# Patient Record
Sex: MALE | Race: WHITE | Employment: FULL TIME | URBAN - METROPOLITAN AREA
[De-identification: names, ages, dates, MRNs, and addresses within clinical notes are randomized per-mention and may not be internally consistent; named-entity substitution may affect disease eponyms.]

---

## 2020-07-13 ENCOUNTER — HOSPITAL ENCOUNTER (OUTPATIENT)
Dept: CT IMAGING | Age: 50
Discharge: HOME | End: 2020-07-15
Payer: COMMERCIAL

## 2020-07-13 PROCEDURE — 71250 CT THORAX DX C-: CPT

## 2021-11-22 ENCOUNTER — HOSPITAL ENCOUNTER (INPATIENT)
Dept: HOSPITAL 100 - ED | Age: 51
LOS: 3 days | Discharge: HOME | DRG: 897 | End: 2021-11-25
Payer: COMMERCIAL

## 2021-11-22 VITALS — HEART RATE: 106 BPM | OXYGEN SATURATION: 96 % | DIASTOLIC BLOOD PRESSURE: 71 MMHG | SYSTOLIC BLOOD PRESSURE: 123 MMHG

## 2021-11-22 VITALS
DIASTOLIC BLOOD PRESSURE: 81 MMHG | RESPIRATION RATE: 16 BRPM | OXYGEN SATURATION: 97 % | TEMPERATURE: 98.2 F | SYSTOLIC BLOOD PRESSURE: 127 MMHG | HEART RATE: 106 BPM

## 2021-11-22 VITALS
TEMPERATURE: 98.06 F | SYSTOLIC BLOOD PRESSURE: 166 MMHG | OXYGEN SATURATION: 97 % | HEART RATE: 110 BPM | RESPIRATION RATE: 18 BRPM | DIASTOLIC BLOOD PRESSURE: 136 MMHG

## 2021-11-22 VITALS
TEMPERATURE: 98.2 F | OXYGEN SATURATION: 96 % | RESPIRATION RATE: 16 BRPM | DIASTOLIC BLOOD PRESSURE: 82 MMHG | SYSTOLIC BLOOD PRESSURE: 123 MMHG | HEART RATE: 97 BPM

## 2021-11-22 VITALS — BODY MASS INDEX: 26.5 KG/M2 | BODY MASS INDEX: 26.1 KG/M2

## 2021-11-22 VITALS
RESPIRATION RATE: 13 BRPM | DIASTOLIC BLOOD PRESSURE: 96 MMHG | HEART RATE: 110 BPM | OXYGEN SATURATION: 95 % | SYSTOLIC BLOOD PRESSURE: 143 MMHG

## 2021-11-22 DIAGNOSIS — F32.A: ICD-10-CM

## 2021-11-22 DIAGNOSIS — J44.9: ICD-10-CM

## 2021-11-22 DIAGNOSIS — G47.30: ICD-10-CM

## 2021-11-22 DIAGNOSIS — Z23: ICD-10-CM
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
## 2021-11-22 DIAGNOSIS — L40.50: ICD-10-CM

## 2021-11-22 DIAGNOSIS — F10.239: Primary | ICD-10-CM

## 2021-11-22 DIAGNOSIS — Z96.641: ICD-10-CM

## 2021-11-22 DIAGNOSIS — G89.29: ICD-10-CM

## 2021-11-22 DIAGNOSIS — Z79.82: ICD-10-CM

## 2021-11-22 DIAGNOSIS — F10.229: ICD-10-CM

## 2021-11-22 DIAGNOSIS — E78.5: ICD-10-CM

## 2021-11-22 DIAGNOSIS — F17.200: ICD-10-CM

## 2021-11-22 DIAGNOSIS — Z79.899: ICD-10-CM

## 2021-11-22 DIAGNOSIS — F41.9: ICD-10-CM

## 2021-11-22 DIAGNOSIS — I10: ICD-10-CM

## 2021-11-22 LAB
ALANINE AMINOTRANSFER ALT/SGPT: 145 U/L (ref 16–61)
ALBUMIN SERPL-MCNC: 3.8 G/DL (ref 3.2–5)
ALCOHOL, BLOOD (MEDICAL)-SERUM: 371 MG/DL
ALKALINE PHOSPHATASE: 128 U/L (ref 45–117)
AMPHET UR-MCNC: NEGATIVE NG/ML
ANION GAP: 15 (ref 5–15)
AST(SGOT): 153 U/L (ref 15–37)
BARBITURATE URINE VISTA: NEGATIVE
BENZODIAZEPINE URINE VISTA: NEGATIVE
BUN SERPL-MCNC: 12 MG/DL (ref 7–18)
BUN/CREAT RATIO: 12.1 RATIO (ref 10–20)
CALCIUM SERPL-MCNC: 9.3 MG/DL (ref 8.5–10.1)
CARBON DIOXIDE: 20 MMOL/L (ref 21–32)
CHLORIDE: 104 MMOL/L (ref 98–107)
COCAINE URINE VISTA: NEGATIVE
DEPRECATED RDW RBC: 48.8 FL (ref 35.1–43.9)
DRUG CONFIRMATION TO FOLLOW?: (no result)
ECSTACY URINE VISTA: NEGATIVE
ERYTHROCYTE [DISTWIDTH] IN BLOOD: 13.7 % (ref 11.6–14.6)
EST GLOM FILT RATE - AFR AMER: 103 ML/MIN (ref 60–?)
ESTIMATED CREATININE CLEARANCE: 92.17 ML/MIN
GLOBULIN: 4.6 G/DL (ref 2.2–4.2)
GLUCOSE: 156 MG/DL (ref 74–106)
HCT VFR BLD AUTO: 43.3 % (ref 40–54)
HEMOGLOBIN: 14.6 G/DL (ref 13–16.5)
HGB BLD-MCNC: 14.6 G/DL (ref 13–16.5)
IMMATURE GRANULOCYTES COUNT: 0.03 X10^3/UL (ref 0–0)
MCV RBC: 96.9 FL (ref 80–94)
MEAN CORP HGB CONC: 33.7 G/DL (ref 32–36)
MEAN PLATELET VOL.: 9.8 FL (ref 6.2–12)
METHADONE URINE VISTA: NEGATIVE
NRBC FLAGGED BY ANALYZER: 0 % (ref 0–5)
PCP UR QL: NEGATIVE
PH UR: 4 [PH]
PLATELET # BLD: 312 K/MM3 (ref 150–450)
PLATELET COUNT: 312 K/MM3 (ref 150–450)
POTASSIUM: 3.7 MMOL/L (ref 3.5–5.1)
RBC # BLD AUTO: 4.47 M/MM3 (ref 4.6–6.2)
RBC DISTRIBUTION WIDTH CV: 13.7 % (ref 11.6–14.6)
RBC DISTRIBUTION WIDTH SD: 48.8 FL (ref 35.1–43.9)
THC URINE VISTA: NEGATIVE
WBC # BLD AUTO: 7.2 K/MM3 (ref 4.4–11)
WHITE BLOOD COUNT: 7.2 K/MM3 (ref 4.4–11)

## 2021-11-22 PROCEDURE — 80053 COMPREHEN METABOLIC PANEL: CPT

## 2021-11-22 PROCEDURE — 85025 COMPLETE CBC W/AUTO DIFF WBC: CPT

## 2021-11-22 PROCEDURE — 80307 DRUG TEST PRSMV CHEM ANLYZR: CPT

## 2021-11-22 PROCEDURE — 97802 MEDICAL NUTRITION INDIV IN: CPT

## 2021-11-22 PROCEDURE — 99406 BEHAV CHNG SMOKING 3-10 MIN: CPT

## 2021-11-22 PROCEDURE — 82077 ASSAY SPEC XCP UR&BREATH IA: CPT

## 2021-11-22 PROCEDURE — 99283 EMERGENCY DEPT VISIT LOW MDM: CPT

## 2021-11-22 PROCEDURE — A4216 STERILE WATER/SALINE, 10 ML: HCPCS

## 2021-11-23 VITALS
RESPIRATION RATE: 18 BRPM | SYSTOLIC BLOOD PRESSURE: 148 MMHG | OXYGEN SATURATION: 93 % | DIASTOLIC BLOOD PRESSURE: 93 MMHG | HEART RATE: 107 BPM | TEMPERATURE: 99 F

## 2021-11-23 VITALS
HEART RATE: 102 BPM | DIASTOLIC BLOOD PRESSURE: 98 MMHG | TEMPERATURE: 97.7 F | RESPIRATION RATE: 18 BRPM | SYSTOLIC BLOOD PRESSURE: 151 MMHG | OXYGEN SATURATION: 97 %

## 2021-11-23 VITALS
TEMPERATURE: 98.24 F | HEART RATE: 105 BPM | OXYGEN SATURATION: 97 % | DIASTOLIC BLOOD PRESSURE: 106 MMHG | SYSTOLIC BLOOD PRESSURE: 151 MMHG | RESPIRATION RATE: 16 BRPM

## 2021-11-23 VITALS
OXYGEN SATURATION: 92 % | TEMPERATURE: 98.3 F | HEART RATE: 90 BPM | DIASTOLIC BLOOD PRESSURE: 78 MMHG | RESPIRATION RATE: 16 BRPM | SYSTOLIC BLOOD PRESSURE: 121 MMHG

## 2021-11-23 VITALS
SYSTOLIC BLOOD PRESSURE: 131 MMHG | DIASTOLIC BLOOD PRESSURE: 97 MMHG | RESPIRATION RATE: 18 BRPM | TEMPERATURE: 98.2 F | OXYGEN SATURATION: 98 % | HEART RATE: 88 BPM

## 2021-11-23 RX ADMIN — Medication 100 MG: at 10:28

## 2021-11-24 VITALS
TEMPERATURE: 97.7 F | OXYGEN SATURATION: 96 % | DIASTOLIC BLOOD PRESSURE: 102 MMHG | RESPIRATION RATE: 18 BRPM | HEART RATE: 72 BPM | SYSTOLIC BLOOD PRESSURE: 142 MMHG

## 2021-11-24 VITALS
HEART RATE: 102 BPM | DIASTOLIC BLOOD PRESSURE: 84 MMHG | TEMPERATURE: 98.96 F | OXYGEN SATURATION: 94 % | RESPIRATION RATE: 16 BRPM | SYSTOLIC BLOOD PRESSURE: 138 MMHG

## 2021-11-24 VITALS
DIASTOLIC BLOOD PRESSURE: 87 MMHG | TEMPERATURE: 98.2 F | OXYGEN SATURATION: 95 % | RESPIRATION RATE: 18 BRPM | HEART RATE: 93 BPM | SYSTOLIC BLOOD PRESSURE: 136 MMHG

## 2021-11-24 VITALS
DIASTOLIC BLOOD PRESSURE: 99 MMHG | OXYGEN SATURATION: 98 % | SYSTOLIC BLOOD PRESSURE: 135 MMHG | HEART RATE: 95 BPM | RESPIRATION RATE: 18 BRPM | TEMPERATURE: 97.88 F

## 2021-11-24 VITALS
OXYGEN SATURATION: 98 % | RESPIRATION RATE: 16 BRPM | SYSTOLIC BLOOD PRESSURE: 138 MMHG | DIASTOLIC BLOOD PRESSURE: 107 MMHG | TEMPERATURE: 98.2 F | HEART RATE: 103 BPM

## 2021-11-24 RX ADMIN — Medication 100 MG: at 09:42

## 2021-11-25 VITALS
RESPIRATION RATE: 18 BRPM | TEMPERATURE: 98.1 F | OXYGEN SATURATION: 96 % | DIASTOLIC BLOOD PRESSURE: 100 MMHG | SYSTOLIC BLOOD PRESSURE: 130 MMHG | HEART RATE: 104 BPM

## 2021-11-25 VITALS
RESPIRATION RATE: 18 BRPM | TEMPERATURE: 98.24 F | SYSTOLIC BLOOD PRESSURE: 125 MMHG | OXYGEN SATURATION: 98 % | DIASTOLIC BLOOD PRESSURE: 90 MMHG | HEART RATE: 90 BPM

## 2021-11-25 RX ADMIN — Medication 100 MG: at 10:26

## 2023-10-12 ENCOUNTER — APPOINTMENT (OUTPATIENT)
Dept: RADIOLOGY | Facility: HOSPITAL | Age: 53
End: 2023-10-12
Payer: COMMERCIAL

## 2023-10-12 ENCOUNTER — HOSPITAL ENCOUNTER (EMERGENCY)
Facility: HOSPITAL | Age: 53
Discharge: HOME | End: 2023-10-12
Attending: EMERGENCY MEDICINE
Payer: COMMERCIAL

## 2023-10-12 VITALS
RESPIRATION RATE: 17 BRPM | HEART RATE: 84 BPM | SYSTOLIC BLOOD PRESSURE: 134 MMHG | TEMPERATURE: 98.1 F | DIASTOLIC BLOOD PRESSURE: 102 MMHG | OXYGEN SATURATION: 96 % | WEIGHT: 195 LBS | BODY MASS INDEX: 27.92 KG/M2 | HEIGHT: 70 IN

## 2023-10-12 DIAGNOSIS — N20.0 KIDNEY STONE: Primary | ICD-10-CM

## 2023-10-12 LAB
ALBUMIN SERPL BCP-MCNC: 4.5 G/DL (ref 3.4–5)
ALP SERPL-CCNC: 90 U/L (ref 33–120)
ALT SERPL W P-5'-P-CCNC: 26 U/L (ref 10–52)
ANION GAP SERPL CALC-SCNC: 12 MMOL/L (ref 10–20)
APPEARANCE UR: CLEAR
AST SERPL W P-5'-P-CCNC: 24 U/L (ref 9–39)
BASOPHILS # BLD AUTO: 0.03 X10*3/UL (ref 0–0.1)
BASOPHILS NFR BLD AUTO: 0.4 %
BILIRUB SERPL-MCNC: 0.8 MG/DL (ref 0–1.2)
BILIRUB UR STRIP.AUTO-MCNC: NEGATIVE MG/DL
BUN SERPL-MCNC: 12 MG/DL (ref 6–23)
CALCIUM SERPL-MCNC: 9.8 MG/DL (ref 8.6–10.3)
CHLORIDE SERPL-SCNC: 103 MMOL/L (ref 98–107)
CO2 SERPL-SCNC: 22 MMOL/L (ref 21–32)
COLOR UR: ABNORMAL
CREAT SERPL-MCNC: 1.27 MG/DL (ref 0.5–1.3)
EOSINOPHIL # BLD AUTO: 0.12 X10*3/UL (ref 0–0.7)
EOSINOPHIL NFR BLD AUTO: 1.5 %
ERYTHROCYTE [DISTWIDTH] IN BLOOD BY AUTOMATED COUNT: 14.2 % (ref 11.5–14.5)
GFR SERPL CREATININE-BSD FRML MDRD: 68 ML/MIN/1.73M*2
GLUCOSE SERPL-MCNC: 133 MG/DL (ref 74–99)
GLUCOSE UR STRIP.AUTO-MCNC: NEGATIVE MG/DL
HCT VFR BLD AUTO: 45.2 % (ref 41–52)
HGB BLD-MCNC: 15.1 G/DL (ref 13.5–17.5)
IMM GRANULOCYTES # BLD AUTO: 0.02 X10*3/UL (ref 0–0.7)
IMM GRANULOCYTES NFR BLD AUTO: 0.2 % (ref 0–0.9)
KETONES UR STRIP.AUTO-MCNC: NEGATIVE MG/DL
LEUKOCYTE ESTERASE UR QL STRIP.AUTO: NEGATIVE
LIPASE SERPL-CCNC: 13 U/L (ref 9–82)
LYMPHOCYTES # BLD AUTO: 1.28 X10*3/UL (ref 1.2–4.8)
LYMPHOCYTES NFR BLD AUTO: 15.9 %
MCH RBC QN AUTO: 30.4 PG (ref 26–34)
MCHC RBC AUTO-ENTMCNC: 33.4 G/DL (ref 32–36)
MCV RBC AUTO: 91 FL (ref 80–100)
MONOCYTES # BLD AUTO: 0.74 X10*3/UL (ref 0.1–1)
MONOCYTES NFR BLD AUTO: 9.2 %
NEUTROPHILS # BLD AUTO: 5.84 X10*3/UL (ref 1.2–7.7)
NEUTROPHILS NFR BLD AUTO: 72.8 %
NITRITE UR QL STRIP.AUTO: NEGATIVE
NRBC BLD-RTO: 0 /100 WBCS (ref 0–0)
PH UR STRIP.AUTO: 6 [PH]
PLATELET # BLD AUTO: 253 X10*3/UL (ref 150–450)
PMV BLD AUTO: 9.5 FL (ref 7.5–11.5)
POTASSIUM SERPL-SCNC: 3.3 MMOL/L (ref 3.5–5.3)
PROT SERPL-MCNC: 7.8 G/DL (ref 6.4–8.2)
PROT UR STRIP.AUTO-MCNC: NEGATIVE MG/DL
RBC # BLD AUTO: 4.97 X10*6/UL (ref 4.5–5.9)
RBC # UR STRIP.AUTO: ABNORMAL /UL
RBC #/AREA URNS AUTO: ABNORMAL /HPF
SODIUM SERPL-SCNC: 134 MMOL/L (ref 136–145)
SP GR UR STRIP.AUTO: 1.01
UROBILINOGEN UR STRIP.AUTO-MCNC: <2 MG/DL
WBC # BLD AUTO: 8 X10*3/UL (ref 4.4–11.3)
WBC #/AREA URNS AUTO: ABNORMAL /HPF

## 2023-10-12 PROCEDURE — 84075 ASSAY ALKALINE PHOSPHATASE: CPT | Performed by: EMERGENCY MEDICINE

## 2023-10-12 PROCEDURE — 74177 CT ABD & PELVIS W/CONTRAST: CPT | Performed by: RADIOLOGY

## 2023-10-12 PROCEDURE — 2550000001 HC RX 255 CONTRASTS: Performed by: EMERGENCY MEDICINE

## 2023-10-12 PROCEDURE — 81001 URINALYSIS AUTO W/SCOPE: CPT | Performed by: EMERGENCY MEDICINE

## 2023-10-12 PROCEDURE — 96374 THER/PROPH/DIAG INJ IV PUSH: CPT

## 2023-10-12 PROCEDURE — 96375 TX/PRO/DX INJ NEW DRUG ADDON: CPT

## 2023-10-12 PROCEDURE — 99284 EMERGENCY DEPT VISIT MOD MDM: CPT | Performed by: EMERGENCY MEDICINE

## 2023-10-12 PROCEDURE — 85025 COMPLETE CBC W/AUTO DIFF WBC: CPT | Performed by: EMERGENCY MEDICINE

## 2023-10-12 PROCEDURE — 2500000004 HC RX 250 GENERAL PHARMACY W/ HCPCS (ALT 636 FOR OP/ED): Performed by: EMERGENCY MEDICINE

## 2023-10-12 PROCEDURE — 74177 CT ABD & PELVIS W/CONTRAST: CPT

## 2023-10-12 PROCEDURE — 83690 ASSAY OF LIPASE: CPT | Performed by: EMERGENCY MEDICINE

## 2023-10-12 PROCEDURE — 36415 COLL VENOUS BLD VENIPUNCTURE: CPT | Performed by: EMERGENCY MEDICINE

## 2023-10-12 RX ORDER — ONDANSETRON 4 MG/1
4 TABLET, ORALLY DISINTEGRATING ORAL EVERY 8 HOURS PRN
Qty: 30 TABLET | Refills: 0 | Status: SHIPPED | OUTPATIENT
Start: 2023-10-12

## 2023-10-12 RX ORDER — ONDANSETRON HYDROCHLORIDE 2 MG/ML
4 INJECTION, SOLUTION INTRAVENOUS ONCE
Status: COMPLETED | OUTPATIENT
Start: 2023-10-12 | End: 2023-10-12

## 2023-10-12 RX ORDER — KETOROLAC TROMETHAMINE 10 MG/1
10 TABLET, FILM COATED ORAL EVERY 6 HOURS PRN
Qty: 20 TABLET | Refills: 0 | Status: ON HOLD | OUTPATIENT
Start: 2023-10-12 | End: 2023-10-19

## 2023-10-12 RX ORDER — ONDANSETRON 4 MG/1
4 TABLET, ORALLY DISINTEGRATING ORAL EVERY 8 HOURS PRN
Qty: 30 TABLET | Refills: 0 | Status: SHIPPED | OUTPATIENT
Start: 2023-10-12 | End: 2023-10-12 | Stop reason: SDUPTHER

## 2023-10-12 RX ORDER — POTASSIUM CHLORIDE 20 MEQ/1
20 TABLET, EXTENDED RELEASE ORAL DAILY
Status: DISCONTINUED | OUTPATIENT
Start: 2023-10-12 | End: 2023-10-12 | Stop reason: HOSPADM

## 2023-10-12 RX ORDER — TAMSULOSIN HYDROCHLORIDE 0.4 MG/1
0.4 CAPSULE ORAL
Qty: 7 CAPSULE | Refills: 0 | Status: SHIPPED | OUTPATIENT
Start: 2023-10-12 | End: 2023-10-19

## 2023-10-12 RX ORDER — KETOROLAC TROMETHAMINE 10 MG/1
10 TABLET, FILM COATED ORAL EVERY 6 HOURS PRN
Qty: 20 TABLET | Refills: 0 | Status: SHIPPED | OUTPATIENT
Start: 2023-10-12 | End: 2023-10-12 | Stop reason: SDUPTHER

## 2023-10-12 RX ORDER — TAMSULOSIN HYDROCHLORIDE 0.4 MG/1
0.4 CAPSULE ORAL
Qty: 7 CAPSULE | Refills: 0 | Status: SHIPPED | OUTPATIENT
Start: 2023-10-12 | End: 2023-10-12 | Stop reason: SDUPTHER

## 2023-10-12 RX ORDER — KETOROLAC TROMETHAMINE 30 MG/ML
15 INJECTION, SOLUTION INTRAMUSCULAR; INTRAVENOUS ONCE
Status: COMPLETED | OUTPATIENT
Start: 2023-10-12 | End: 2023-10-12

## 2023-10-12 RX ADMIN — KETOROLAC TROMETHAMINE 15 MG: 30 INJECTION, SOLUTION INTRAMUSCULAR at 10:31

## 2023-10-12 RX ADMIN — POTASSIUM CHLORIDE 20 MEQ: 1500 TABLET, EXTENDED RELEASE ORAL at 11:14

## 2023-10-12 RX ADMIN — IOHEXOL 90 ML: 350 INJECTION, SOLUTION INTRAVENOUS at 11:26

## 2023-10-12 RX ADMIN — ONDANSETRON 4 MG: 2 INJECTION INTRAMUSCULAR; INTRAVENOUS at 10:31

## 2023-10-12 ASSESSMENT — COLUMBIA-SUICIDE SEVERITY RATING SCALE - C-SSRS
2. HAVE YOU ACTUALLY HAD ANY THOUGHTS OF KILLING YOURSELF?: NO
6. HAVE YOU EVER DONE ANYTHING, STARTED TO DO ANYTHING, OR PREPARED TO DO ANYTHING TO END YOUR LIFE?: NO
1. IN THE PAST MONTH, HAVE YOU WISHED YOU WERE DEAD OR WISHED YOU COULD GO TO SLEEP AND NOT WAKE UP?: NO

## 2023-10-12 ASSESSMENT — PAIN - FUNCTIONAL ASSESSMENT: PAIN_FUNCTIONAL_ASSESSMENT: 0-10

## 2023-10-12 ASSESSMENT — PAIN DESCRIPTION - LOCATION: LOCATION: ABDOMEN

## 2023-10-12 ASSESSMENT — PAIN SCALES - GENERAL
PAINLEVEL_OUTOF10: 7
PAINLEVEL_OUTOF10: 8
PAINLEVEL_OUTOF10: 7
PAINLEVEL_OUTOF10: 8

## 2023-10-12 NOTE — ED PROVIDER NOTES
HPI   Chief Complaint   Patient presents with    Abdominal Pain     Pain with urination for the past couple days. Also lower back since yesterday. Vomiting yesterday. Noted mucous in urine yesterday       Limitations to History: None    HPI: 53-year-old male presents with concern for left back and flank pain.  States he began having increasing urinary symptoms over the past 3 days.  Noticed some sediment within his urine yesterday.  States the pain is increased in his left back.  Sharp in nature.  Admits to vomiting throughout the day and night yesterday.  Denies any fever, chills, changes in bowel habits, fall or trauma.    ------------------------------------------------------------------------------------------------------------------------------------------  Physical Exam:    VS: As documented in the triage note and EMR flowsheet from this visit were reviewed.    Appearance: Alert. cooperative,  in no acute distress.   Skin: Intact,  dry skin, no lesions, rash, petechiae or purpura.   Eyes: PERRLA, EOMs intact,  Conjunctiva pink with no redness or exudates.   HENT: Normocephalic, atraumatic. Nares patent. No intraoral lesions.   Neck: Supple, without meningismus. Trachea at midline. No lymphadenopathy.  Pulmonary: Clear bilaterally with good chest wall excursion. No rales, rhonchi or wheezing. No accessory muscle use or stridor.  Cardiac: Regular rate and rhythm, no rubs, murmurs, or gallops.   Abdomen: Abdomen is soft, nontender, and nondistended.  No palpable organomegaly.  No rebound or guarding.  Left CVA tenderness. Nonsurgical abdomen  Genitourinary: Exam deferred.  Musculoskeletal: Full range of motion.  Pulses full and equal. No cyanosis, clubbing, or edema.  Psychiatric: Appropriate mood and affect.                            No data recorded                Patient History   Past Medical History:   Diagnosis Date    Alcohol abuse     Arthritis     Chronic obstructive pulmonary disease, unspecified  (CMS/[facility])     Chronic bronchitis, obstructive    Cutaneous abscess, unspecified 09/10/2021    Abscess    Hypertension     Personal history of diseases of the blood and blood-forming organs and certain disorders involving the immune mechanism     History of immunodeficiency    Personal history of other diseases of the musculoskeletal system and connective tissue     History of rheumatoid arthritis    Psoriatic arthritis mutilans (CMS/HCC)     Arthritis mutilans     Past Surgical History:   Procedure Laterality Date    OTHER SURGICAL HISTORY  09/10/2021    Colonoscopy    OTHER SURGICAL HISTORY  09/10/2021    Shoulder surgery     No family history on file.  Social History     Tobacco Use    Smoking status: Every Day     Packs/day: .5     Types: Cigarettes    Smokeless tobacco: Never   Vaping Use    Vaping Use: Never used   Substance Use Topics    Alcohol use: Yes     Comment: 1 pint vodka    Drug use: Never       Physical Exam   ED Triage Vitals   Temp Heart Rate Resp BP   10/12/23 1005 10/12/23 1007 10/12/23 1007 10/12/23 1007   36.7 °C (98.1 °F) 97 18 (!) 146/104      SpO2 Temp Source Heart Rate Source Patient Position   10/12/23 1007 10/12/23 1005 -- --   98 % Oral        BP Location FiO2 (%)     -- --             Physical Exam    ED Course & MDM   Diagnoses as of 10/12/23 1205   Kidney stone       Medical Decision Making  Labs Reviewed  URINALYSIS WITH REFLEX MICROSCOPIC - Abnormal     Color, Urine                  Straw                  Appearance, Urine             Clear                  Specific Gravity, Urine       1.008                  pH, Urine                     6.0                    Protein, Urine                NEGATIVE                Glucose, Urine                NEGATIVE                Blood, Urine                    (*)                  Ketones, Urine                NEGATIVE                Bilirubin, Urine              NEGATIVE                Urobilinogen, Urine           <2.0                    Nitrite, Urine                NEGATIVE                Leukocyte Esterase, Urine     NEGATIVE             COMPREHENSIVE METABOLIC PANEL - Abnormal     Glucose                       133 (*)                Sodium                        134 (*)                Potassium                     3.3 (*)                Chloride                      103                    Bicarbonate                   22                     Anion Gap                     12                     Urea Nitrogen                 12                     Creatinine                    1.27                   eGFR                          68                     Calcium                       9.8                    Albumin                       4.5                    Alkaline Phosphatase          90                     Total Protein                 7.8                    AST                           24                     Bilirubin, Total              0.8                    ALT                           26                  URINALYSIS MICROSCOPIC ONLY - Abnormal     WBC, Urine                    1-5                    RBC, Urine                    6-10 (*)            LIPASE - Normal     Lipase                        13                         Narrative: Venipuncture immediately after or during the administration of Metamizole may lead to falsely low results. Testing should be performed immediately prior to Metamizole dosing.  CBC WITH AUTO DIFFERENTIAL  CT abdomen pelvis w IV contrast   Final Result    4 mm left ureterovesical junction calculus causing mild    hydronephrosis.          Signed by: Damian Kern 10/12/2023 11:44 AM    Dictation workstation:   [id number]     Medical Decision Making:  Patient appears well and nontoxic.  Found to have a 4 mm ureterovesicular stone.  No evidence of decreased kidney function.  No evidence of acute urinary infection.  Patient treated with intravenous Toradol and Zofran.  Treated with oral potassium.  Patient feeling  improved on reevaluation.  Will be discharged home with Toradol, Zofran, Flomax.  Given urology follow-up.  Stable at time of discharge.    Differential Diagnoses Considered: Musculoskeletal pain, kidney stone, diverticulitis, UTI    Independent Interpretation of Studies:  I independently interpreted: CT of the abdomen pelvis without contrast shows a left-sided kidney stone.    Escalation of Care:  Appropriate for discharge and follow-up with primary care and possibly urology if needed.    Prescription Drug Consideration: Oral Toradol, Zofran, Flomax.            Procedure  Procedures     Darwin Kramer,   10/12/23 8704

## 2023-10-17 PROBLEM — F10.930 ALCOHOL WITHDRAWAL SYNDROME, UNCOMPLICATED (MULTI): Status: ACTIVE | Noted: 2021-11-28

## 2023-10-17 PROBLEM — L02.91 ABSCESS: Status: ACTIVE | Noted: 2021-08-23

## 2023-10-17 PROBLEM — R73.9 HYPERGLYCEMIA: Status: ACTIVE | Noted: 2021-12-31

## 2023-10-17 PROBLEM — G93.40 ENCEPHALOPATHY: Status: ACTIVE | Noted: 2022-12-22

## 2023-10-17 PROBLEM — M87.159: Status: ACTIVE | Noted: 2021-11-28
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
## 2023-10-17 PROBLEM — F10.10 ALCOHOL ABUSE: Status: ACTIVE | Noted: 2023-02-16

## 2023-10-17 PROBLEM — G93.32 CHRONIC FATIGUE SYNDROME: Status: ACTIVE | Noted: 2023-02-16

## 2023-10-17 PROBLEM — J20.9 ACUTE BRONCHITIS WITH BRONCHOSPASM: Status: ACTIVE | Noted: 2020-03-20

## 2023-10-17 PROBLEM — R47.81 SLURRED SPEECH: Status: ACTIVE | Noted: 2022-07-29

## 2023-10-17 PROBLEM — M16.11 ARTHRITIS OF RIGHT HIP: Status: ACTIVE | Noted: 2021-10-21

## 2023-10-17 PROBLEM — G45.9 TIA (TRANSIENT ISCHEMIC ATTACK): Status: ACTIVE | Noted: 2022-09-17

## 2023-10-17 PROBLEM — D84.9 IMMUNODEFICIENCY DISORDER (MULTI): Status: ACTIVE | Noted: 2020-07-07

## 2023-10-17 PROBLEM — L08.9 INFECTED SEBACEOUS CYST: Status: ACTIVE | Noted: 2022-02-06

## 2023-10-17 PROBLEM — R47.01 APHASIA: Status: ACTIVE | Noted: 2022-08-04

## 2023-10-17 PROBLEM — T07.XXXA LACERATION OF MULTIPLE SITES: Status: ACTIVE | Noted: 2023-05-05

## 2023-10-17 PROBLEM — F32.9 MAJOR DEPRESSIVE DISORDER: Status: ACTIVE | Noted: 2023-02-16

## 2023-10-17 PROBLEM — R47.9 DISTURBANCE IN SPEECH: Status: ACTIVE | Noted: 2022-08-05

## 2023-10-17 PROBLEM — F39 MOOD DISORDER (CMS-HCC): Status: ACTIVE | Noted: 2022-10-08

## 2023-10-17 PROBLEM — F10.921 ALCOHOL INTOXICATION WITH DELIRIUM (CMS-HCC): Status: ACTIVE | Noted: 2022-08-04

## 2023-10-17 PROBLEM — Z79.60 LONG-TERM USE OF IMMUNOSUPPRESSANT MEDICATION: Status: ACTIVE | Noted: 2017-12-19

## 2023-10-17 PROBLEM — L40.52 PSORIATIC ARTHRITIS MUTILANS (MULTI): Status: ACTIVE | Noted: 2020-06-25

## 2023-10-17 PROBLEM — L72.3 INFECTED SEBACEOUS CYST: Status: ACTIVE | Noted: 2022-02-06

## 2023-10-17 PROBLEM — L08.9 INFECTED CYST OF SKIN: Status: ACTIVE | Noted: 2019-05-16

## 2023-10-17 PROBLEM — J44.1 ACUTE EXACERBATION OF CHRONIC OBSTRUCTIVE AIRWAYS DISEASE (MULTI): Status: ACTIVE | Noted: 2020-03-31

## 2023-10-17 PROBLEM — R50.82 POSTOPERATIVE FEVER: Status: ACTIVE | Noted: 2022-11-20

## 2023-10-17 PROBLEM — F41.1 GENERALIZED ANXIETY DISORDER: Status: ACTIVE | Noted: 2023-02-16

## 2023-10-17 PROBLEM — E78.5 HYPERLIPIDEMIA: Status: ACTIVE | Noted: 2022-07-29

## 2023-10-17 PROBLEM — F10.20 ALCOHOL USE DISORDER, SEVERE, DEPENDENCE (MULTI): Status: ACTIVE | Noted: 2022-10-08

## 2023-10-17 PROBLEM — J44.89 CHRONIC OBSTRUCTIVE BRONCHITIS (MULTI): Status: ACTIVE | Noted: 2020-06-25

## 2023-10-17 PROBLEM — D23.4 DERMOID CYST OF SCALP: Status: ACTIVE | Noted: 2019-05-16

## 2023-10-17 PROBLEM — L72.9 INFECTED CYST OF SKIN: Status: ACTIVE | Noted: 2019-05-16

## 2023-10-17 PROBLEM — M06.9 RHEUMATOID ARTHRITIS (MULTI): Status: ACTIVE | Noted: 2021-08-23

## 2023-10-17 PROBLEM — M87.051 AVASCULAR NECROSIS OF RIGHT FEMORAL HEAD (MULTI): Status: ACTIVE | Noted: 2021-10-21

## 2023-10-17 PROBLEM — R68.89 SPELLS OF DECREASED ATTENTIVENESS: Status: ACTIVE | Noted: 2022-09-19

## 2023-10-17 RX ORDER — ARMODAFINIL 200 MG/1
200 TABLET ORAL DAILY
COMMUNITY
Start: 2023-09-11

## 2023-10-17 RX ORDER — LISINOPRIL 20 MG/1
1 TABLET ORAL DAILY
COMMUNITY

## 2023-10-17 RX ORDER — AMLODIPINE BESYLATE 10 MG/1
1 TABLET ORAL DAILY
COMMUNITY
Start: 2020-07-03

## 2023-10-17 RX ORDER — FLUOXETINE HYDROCHLORIDE 20 MG/1
20 CAPSULE ORAL
Status: ON HOLD | COMMUNITY
Start: 2022-08-04 | End: 2023-10-19 | Stop reason: ALTCHOICE

## 2023-10-17 RX ORDER — TALC
3 POWDER (GRAM) TOPICAL DAILY PRN
Status: ON HOLD | COMMUNITY
Start: 2022-12-14 | End: 2023-10-19

## 2023-10-17 RX ORDER — ASPIRIN 81 MG/1
1 TABLET ORAL DAILY
Status: ON HOLD | COMMUNITY
End: 2023-10-19 | Stop reason: ALTCHOICE

## 2023-10-17 RX ORDER — DULOXETIN HYDROCHLORIDE 30 MG/1
30 CAPSULE, DELAYED RELEASE ORAL
Status: ON HOLD | COMMUNITY
Start: 2022-12-20 | End: 2023-10-19 | Stop reason: ALTCHOICE

## 2023-10-17 RX ORDER — ACYCLOVIR 400 MG/1
1 TABLET ORAL DAILY
COMMUNITY
Start: 2021-03-08

## 2023-10-17 RX ORDER — TOPIRAMATE 50 MG/1
50 TABLET, FILM COATED ORAL
Status: ON HOLD | COMMUNITY
End: 2023-10-19 | Stop reason: ALTCHOICE

## 2023-10-17 RX ORDER — CHLORDIAZEPOXIDE HYDROCHLORIDE 25 MG/1
25-50 CAPSULE, GELATIN COATED ORAL
Status: ON HOLD | COMMUNITY
Start: 2022-12-15 | End: 2023-10-19 | Stop reason: ALTCHOICE

## 2023-10-17 RX ORDER — CLONAZEPAM 1 MG/1
1 TABLET ORAL 3 TIMES DAILY
Status: ON HOLD | COMMUNITY
End: 2023-10-19

## 2023-10-17 RX ORDER — ATORVASTATIN CALCIUM 80 MG/1
1 TABLET, FILM COATED ORAL DAILY
COMMUNITY
Start: 2022-07-31

## 2023-10-17 RX ORDER — PREDNISONE 10 MG/1
10 TABLET ORAL
Status: ON HOLD | COMMUNITY
Start: 2020-12-28 | End: 2023-10-19 | Stop reason: ALTCHOICE

## 2023-10-17 RX ORDER — TRIAMCINOLONE ACETONIDE 1 MG/G
CREAM TOPICAL 2 TIMES DAILY
COMMUNITY

## 2023-10-17 RX ORDER — CLONIDINE HYDROCHLORIDE 0.1 MG/1
1 TABLET ORAL 2 TIMES DAILY
COMMUNITY
Start: 2022-12-14

## 2023-10-17 RX ORDER — ALBUTEROL SULFATE 90 UG/1
2 AEROSOL, METERED RESPIRATORY (INHALATION) EVERY 4 HOURS PRN
COMMUNITY
Start: 2020-08-27

## 2023-10-17 RX ORDER — BUSPIRONE HYDROCHLORIDE 15 MG/1
15 TABLET ORAL
COMMUNITY

## 2023-10-17 RX ORDER — DOXYCYCLINE 100 MG/1
1 TABLET ORAL EVERY 12 HOURS
Status: ON HOLD | COMMUNITY
Start: 2021-08-27 | End: 2023-10-19 | Stop reason: ALTCHOICE

## 2023-10-17 RX ORDER — LANOLIN ALCOHOL/MO/W.PET/CERES
100 CREAM (GRAM) TOPICAL
Status: ON HOLD | COMMUNITY
Start: 2022-08-01 | End: 2023-10-19 | Stop reason: ALTCHOICE

## 2023-10-17 RX ORDER — ESCITALOPRAM OXALATE 10 MG/1
1 TABLET ORAL DAILY
COMMUNITY
Start: 2023-09-08

## 2023-10-17 RX ORDER — BUPROPION HYDROCHLORIDE 150 MG/1
1 TABLET, EXTENDED RELEASE ORAL DAILY
Status: ON HOLD | COMMUNITY
Start: 2020-08-27 | End: 2023-10-19 | Stop reason: ALTCHOICE

## 2023-10-17 RX ORDER — ACAMPROSATE CALCIUM 333 MG/1
2 TABLET, DELAYED RELEASE ORAL 3 TIMES DAILY
Status: ON HOLD | COMMUNITY
End: 2023-10-19 | Stop reason: ALTCHOICE

## 2023-10-17 RX ORDER — GABAPENTIN 600 MG/1
600 TABLET ORAL 3 TIMES DAILY
COMMUNITY

## 2023-10-17 RX ORDER — DIAZEPAM 5 MG/1
5 TABLET ORAL
Status: ON HOLD | COMMUNITY
End: 2023-10-19 | Stop reason: ALTCHOICE

## 2023-10-17 RX ORDER — FLUTICASONE PROPIONATE AND SALMETEROL XINAFOATE 115; 21 UG/1; UG/1
AEROSOL, METERED RESPIRATORY (INHALATION)
Status: ON HOLD | COMMUNITY
Start: 2021-08-24 | End: 2023-10-19 | Stop reason: ALTCHOICE

## 2023-10-17 RX ORDER — DOXYCYCLINE 100 MG/1
100 CAPSULE ORAL
Status: ON HOLD | COMMUNITY
Start: 2020-05-16 | End: 2023-10-19 | Stop reason: ALTCHOICE

## 2023-10-17 RX ORDER — HYDROXYZINE PAMOATE 25 MG/1
25 CAPSULE ORAL 3 TIMES DAILY PRN
COMMUNITY

## 2023-10-17 RX ORDER — SECUKINUMAB 150 MG/ML
150 INJECTION SUBCUTANEOUS
COMMUNITY

## 2023-10-17 RX ORDER — PRIMIDONE 50 MG/1
0.5 TABLET ORAL NIGHTLY
COMMUNITY

## 2023-10-17 RX ORDER — TRAZODONE HYDROCHLORIDE 50 MG/1
50 TABLET ORAL
COMMUNITY
Start: 2022-12-16

## 2023-10-17 RX ORDER — FOLIC ACID 1 MG/1
1 TABLET ORAL DAILY
Status: ON HOLD | COMMUNITY
Start: 2022-08-01 | End: 2023-10-19 | Stop reason: ALTCHOICE

## 2023-10-18 ENCOUNTER — PREP FOR PROCEDURE (OUTPATIENT)
Dept: UROLOGY | Facility: CLINIC | Age: 53
End: 2023-10-18

## 2023-10-18 ENCOUNTER — OFFICE VISIT (OUTPATIENT)
Dept: UROLOGY | Facility: CLINIC | Age: 53
End: 2023-10-18
Payer: COMMERCIAL

## 2023-10-18 VITALS — RESPIRATION RATE: 18 BRPM | WEIGHT: 195 LBS | BODY MASS INDEX: 27.98 KG/M2

## 2023-10-18 DIAGNOSIS — R35.1 NOCTURIA: ICD-10-CM

## 2023-10-18 DIAGNOSIS — R10.9 FLANK PAIN: ICD-10-CM

## 2023-10-18 DIAGNOSIS — N20.1 URETERAL CALCULUS: ICD-10-CM

## 2023-10-18 DIAGNOSIS — N20.0 KIDNEY STONE: ICD-10-CM

## 2023-10-18 PROCEDURE — 99204 OFFICE O/P NEW MOD 45 MIN: CPT | Performed by: UROLOGY

## 2023-10-18 ASSESSMENT — ENCOUNTER SYMPTOMS
EYES NEGATIVE: 1
CHILLS: 0
DIFFICULTY URINATING: 0
SHORTNESS OF BREATH: 0
FEVER: 0
ENDOCRINE NEGATIVE: 1
PSYCHIATRIC NEGATIVE: 1
FLANK PAIN: 1
NAUSEA: 0
COUGH: 0
ALLERGIC/IMMUNOLOGIC NEGATIVE: 1

## 2023-10-18 NOTE — PROGRESS NOTES
Subjective   Patient ID: Jaime Liz Jr. is a 53 y.o. male who presents for Nephrolithiasis.  Patient is here for an ER follow for a kidney stone. CT showed 4mm left UVJ stone with mild hydro.. Patient has not passed the stone. He was started on Flomax, Toradol and zofran.. has F/C, No N/V.  Chronic BPH with LUTs, sx are chronic and stable.. Nocturia x 1, depending on fluid intake. ED is mild, complains of retrograde ejaculation. Retrograde ejaculation has been a chronic issue        Review of Systems   Constitutional:  Negative for chills and fever.   HENT: Negative.     Eyes: Negative.    Respiratory:  Negative for cough and shortness of breath.    Cardiovascular:  Negative for chest pain and leg swelling.   Gastrointestinal:  Negative for nausea.   Endocrine: Negative.    Genitourinary:  Positive for flank pain. Negative for difficulty urinating.        Negative except for documented in HPI   Allergic/Immunologic: Negative.    Neurological:         Alert & oriented X 3   Hematological:         Denies blood thinners   Psychiatric/Behavioral: Negative.         Objective   Physical Exam  Vitals and nursing note reviewed.   Constitutional:       General: He is not in acute distress.     Appearance: Normal appearance.   Pulmonary:      Effort: Pulmonary effort is normal.   Abdominal:      Tenderness: There is no abdominal tenderness.   Genitourinary:     Comments: Kidneys non palpable bilaterally  Bladder non palpable or tender  Scrotum no mass, No hydrocele  Epididymis- No spermatocele. Non Tender.  Testicles: No mass  Urethra: No discharge  Penis within normal limits... No lesions. circumcised  Prostate - symmetric, no nodules. Benign  Seminal Vesicals: No mass.  Sphincter tone: normal  Neurological:      Mental Status: He is alert.         Assessment/Plan   Problem List Items Addressed This Visit             ICD-10-CM       Gastrointestinal and Abdominal    Flank pain R10.9       Genitourinary and  Reproductive    Kidney stone N20.0    Nocturia R35.1        All available PSA values reviewed, Options discussed. Questions answered.  PSA ordered   Diet changes for prostate health discussed and educational information given. Pros/Cons of prostate health supplements discussed.   Treatment options for LUTS reviewed  Will evaluate for Stricture at time of Cysto  Discussed timed voiding. Discussed fluid and caffeine intake  Treatment options for ED reviewed.  Discussed retrograde ejaculation  Lifestyle change to help prevent UTIs discussed. Encouraged fluid intake.  CT reviewed  Will proceed with Left ureteroscopy with Holmium and Stent

## 2023-10-19 ENCOUNTER — HOSPITAL ENCOUNTER (OUTPATIENT)
Facility: HOSPITAL | Age: 53
Setting detail: OUTPATIENT SURGERY
Discharge: HOME | End: 2023-10-19
Attending: UROLOGY | Admitting: UROLOGY
Payer: COMMERCIAL

## 2023-10-19 ENCOUNTER — APPOINTMENT (OUTPATIENT)
Dept: RADIOLOGY | Facility: HOSPITAL | Age: 53
End: 2023-10-19
Payer: COMMERCIAL

## 2023-10-19 ENCOUNTER — ANESTHESIA EVENT (OUTPATIENT)
Dept: OPERATING ROOM | Facility: HOSPITAL | Age: 53
End: 2023-10-19
Payer: COMMERCIAL

## 2023-10-19 ENCOUNTER — ANESTHESIA (OUTPATIENT)
Dept: OPERATING ROOM | Facility: HOSPITAL | Age: 53
End: 2023-10-19
Payer: COMMERCIAL

## 2023-10-19 VITALS
WEIGHT: 185.85 LBS | HEIGHT: 70 IN | SYSTOLIC BLOOD PRESSURE: 137 MMHG | DIASTOLIC BLOOD PRESSURE: 95 MMHG | TEMPERATURE: 98.6 F | OXYGEN SATURATION: 95 % | BODY MASS INDEX: 26.61 KG/M2 | HEART RATE: 91 BPM | RESPIRATION RATE: 15 BRPM

## 2023-10-19 DIAGNOSIS — N20.1 URETERAL CALCULUS: ICD-10-CM

## 2023-10-19 DIAGNOSIS — N20.1 URETERAL CALCULUS: Primary | ICD-10-CM

## 2023-10-19 DIAGNOSIS — R10.9 FLANK PAIN: ICD-10-CM

## 2023-10-19 DIAGNOSIS — N20.0 KIDNEY STONE: ICD-10-CM

## 2023-10-19 PROCEDURE — 3700000002 HC GENERAL ANESTHESIA TIME - EACH INCREMENTAL 1 MINUTE: Performed by: UROLOGY

## 2023-10-19 PROCEDURE — 2500000004 HC RX 250 GENERAL PHARMACY W/ HCPCS (ALT 636 FOR OP/ED): Performed by: ANESTHESIOLOGY

## 2023-10-19 PROCEDURE — 7100000010 HC PHASE TWO TIME - EACH INCREMENTAL 1 MINUTE: Performed by: UROLOGY

## 2023-10-19 PROCEDURE — 52332 CYSTOSCOPY AND TREATMENT: CPT | Performed by: UROLOGY

## 2023-10-19 PROCEDURE — 2720000007 HC OR 272 NO HCPCS: Performed by: UROLOGY

## 2023-10-19 PROCEDURE — 7100000009 HC PHASE TWO TIME - INITIAL BASE CHARGE: Performed by: UROLOGY

## 2023-10-19 PROCEDURE — 74420 UROGRAPHY RTRGR +-KUB: CPT

## 2023-10-19 PROCEDURE — C1769 GUIDE WIRE: HCPCS | Performed by: UROLOGY

## 2023-10-19 PROCEDURE — 3600000008 HC OR TIME - EACH INCREMENTAL 1 MINUTE - PROCEDURE LEVEL THREE: Performed by: UROLOGY

## 2023-10-19 PROCEDURE — 7100000001 HC RECOVERY ROOM TIME - INITIAL BASE CHARGE: Performed by: UROLOGY

## 2023-10-19 PROCEDURE — 7100000002 HC RECOVERY ROOM TIME - EACH INCREMENTAL 1 MINUTE: Performed by: UROLOGY

## 2023-10-19 PROCEDURE — 3700000001 HC GENERAL ANESTHESIA TIME - INITIAL BASE CHARGE: Performed by: UROLOGY

## 2023-10-19 PROCEDURE — 2780000003 HC OR 278 NO HCPCS: Performed by: UROLOGY

## 2023-10-19 PROCEDURE — 74420 UROGRAPHY RTRGR +-KUB: CPT | Performed by: UROLOGY

## 2023-10-19 PROCEDURE — 2550000001 HC RX 255 CONTRASTS: Performed by: UROLOGY

## 2023-10-19 PROCEDURE — 52351 CYSTOURETERO & OR PYELOSCOPE: CPT | Performed by: UROLOGY

## 2023-10-19 PROCEDURE — 2500000001 HC RX 250 WO HCPCS SELF ADMINISTERED DRUGS (ALT 637 FOR MEDICARE OP): Performed by: ANESTHESIOLOGY

## 2023-10-19 PROCEDURE — 3600000003 HC OR TIME - INITIAL BASE CHARGE - PROCEDURE LEVEL THREE: Performed by: UROLOGY

## 2023-10-19 DEVICE — URETERAL STENT
Type: IMPLANTABLE DEVICE | Site: URETER | Status: FUNCTIONAL
Brand: POLARIS™ ULTRA

## 2023-10-19 RX ORDER — PHENAZOPYRIDINE HYDROCHLORIDE 200 MG/1
200 TABLET, FILM COATED ORAL 3 TIMES DAILY PRN
Qty: 30 TABLET | Refills: 0 | Status: SHIPPED | OUTPATIENT
Start: 2023-10-19

## 2023-10-19 RX ORDER — PROPOFOL 10 MG/ML
INJECTION, EMULSION INTRAVENOUS AS NEEDED
Status: DISCONTINUED | OUTPATIENT
Start: 2023-10-19 | End: 2023-10-19

## 2023-10-19 RX ORDER — HYDROCODONE BITARTRATE AND ACETAMINOPHEN 5; 325 MG/1; MG/1
2 TABLET ORAL EVERY 6 HOURS PRN
Status: DISCONTINUED | OUTPATIENT
Start: 2023-10-19 | End: 2023-10-19 | Stop reason: HOSPADM

## 2023-10-19 RX ORDER — CIPROFLOXACIN 500 MG/1
250 TABLET ORAL 2 TIMES DAILY
Qty: 3 TABLET | Refills: 0 | Status: SHIPPED | OUTPATIENT
Start: 2023-10-19 | End: 2023-10-22

## 2023-10-19 RX ORDER — LIDOCAINE HYDROCHLORIDE 10 MG/ML
0.1 INJECTION, SOLUTION EPIDURAL; INFILTRATION; INTRACAUDAL; PERINEURAL ONCE
Status: DISCONTINUED | OUTPATIENT
Start: 2023-10-19 | End: 2023-10-19 | Stop reason: HOSPADM

## 2023-10-19 RX ORDER — MIDAZOLAM HYDROCHLORIDE 1 MG/ML
2 INJECTION INTRAMUSCULAR; INTRAVENOUS ONCE AS NEEDED
Status: COMPLETED | OUTPATIENT
Start: 2023-10-19 | End: 2023-10-19

## 2023-10-19 RX ORDER — SODIUM CHLORIDE, SODIUM LACTATE, POTASSIUM CHLORIDE, CALCIUM CHLORIDE 600; 310; 30; 20 MG/100ML; MG/100ML; MG/100ML; MG/100ML
100 INJECTION, SOLUTION INTRAVENOUS CONTINUOUS
Status: DISCONTINUED | OUTPATIENT
Start: 2023-10-19 | End: 2023-10-19 | Stop reason: HOSPADM

## 2023-10-19 RX ORDER — KETOROLAC TROMETHAMINE 10 MG/1
10 TABLET, FILM COATED ORAL EVERY 6 HOURS PRN
Qty: 20 TABLET | Refills: 0 | OUTPATIENT
Start: 2023-10-19 | End: 2024-01-26

## 2023-10-19 RX ORDER — FENTANYL CITRATE 50 UG/ML
INJECTION, SOLUTION INTRAMUSCULAR; INTRAVENOUS AS NEEDED
Status: DISCONTINUED | OUTPATIENT
Start: 2023-10-19 | End: 2023-10-19

## 2023-10-19 RX ORDER — TOPIRAMATE 50 MG/1
50 TABLET, FILM COATED ORAL DAILY
COMMUNITY

## 2023-10-19 RX ORDER — ONDANSETRON HYDROCHLORIDE 2 MG/ML
4 INJECTION, SOLUTION INTRAVENOUS ONCE
Status: COMPLETED | OUTPATIENT
Start: 2023-10-19 | End: 2023-10-19

## 2023-10-19 RX ORDER — HYDROMORPHONE HYDROCHLORIDE 2 MG/ML
0.5 INJECTION, SOLUTION INTRAMUSCULAR; INTRAVENOUS; SUBCUTANEOUS EVERY 5 MIN PRN
Status: DISCONTINUED | OUTPATIENT
Start: 2023-10-19 | End: 2023-10-19 | Stop reason: HOSPADM

## 2023-10-19 RX ORDER — ONDANSETRON HYDROCHLORIDE 2 MG/ML
4 INJECTION, SOLUTION INTRAVENOUS ONCE
Status: DISCONTINUED | OUTPATIENT
Start: 2023-10-19 | End: 2023-10-19 | Stop reason: HOSPADM

## 2023-10-19 RX ADMIN — MIDAZOLAM HYDROCHLORIDE 2 MG: 1 INJECTION, SOLUTION INTRAMUSCULAR; INTRAVENOUS at 16:10

## 2023-10-19 RX ADMIN — HYDROMORPHONE HYDROCHLORIDE 0.5 MG: 2 INJECTION, SOLUTION INTRAMUSCULAR; INTRAVENOUS; SUBCUTANEOUS at 17:38

## 2023-10-19 RX ADMIN — SODIUM CHLORIDE, POTASSIUM CHLORIDE, SODIUM LACTATE AND CALCIUM CHLORIDE 100 ML/HR: 600; 310; 30; 20 INJECTION, SOLUTION INTRAVENOUS at 15:29

## 2023-10-19 RX ADMIN — ONDANSETRON 4 MG: 2 INJECTION INTRAMUSCULAR; INTRAVENOUS at 15:30

## 2023-10-19 RX ADMIN — HYDROCODONE BITARTRATE AND ACETAMINOPHEN 2 TABLET: 5; 325 TABLET ORAL at 18:14

## 2023-10-19 RX ADMIN — SODIUM CHLORIDE, POTASSIUM CHLORIDE, SODIUM LACTATE AND CALCIUM CHLORIDE 100 ML/HR: 600; 310; 30; 20 INJECTION, SOLUTION INTRAVENOUS at 17:45

## 2023-10-19 RX ADMIN — HYDROMORPHONE HYDROCHLORIDE 0.5 MG: 2 INJECTION, SOLUTION INTRAMUSCULAR; INTRAVENOUS; SUBCUTANEOUS at 17:52

## 2023-10-19 RX ADMIN — FENTANYL CITRATE 100 MCG: 50 INJECTION, SOLUTION INTRAMUSCULAR; INTRAVENOUS at 17:02

## 2023-10-19 RX ADMIN — PROPOFOL 200 MG: 10 INJECTION, EMULSION INTRAVENOUS at 17:02

## 2023-10-19 SDOH — HEALTH STABILITY: MENTAL HEALTH: CURRENT SMOKER: 0

## 2023-10-19 ASSESSMENT — COLUMBIA-SUICIDE SEVERITY RATING SCALE - C-SSRS
1. IN THE PAST MONTH, HAVE YOU WISHED YOU WERE DEAD OR WISHED YOU COULD GO TO SLEEP AND NOT WAKE UP?: NO
2. HAVE YOU ACTUALLY HAD ANY THOUGHTS OF KILLING YOURSELF?: NO
6. HAVE YOU EVER DONE ANYTHING, STARTED TO DO ANYTHING, OR PREPARED TO DO ANYTHING TO END YOUR LIFE?: NO

## 2023-10-19 ASSESSMENT — PAIN SCALES - GENERAL
PAIN_LEVEL: 2
PAINLEVEL_OUTOF10: 6
PAINLEVEL_OUTOF10: 7
PAINLEVEL_OUTOF10: 10 - WORST POSSIBLE PAIN
PAINLEVEL_OUTOF10: 10 - WORST POSSIBLE PAIN
PAINLEVEL_OUTOF10: 7

## 2023-10-19 ASSESSMENT — PAIN - FUNCTIONAL ASSESSMENT
PAIN_FUNCTIONAL_ASSESSMENT: 0-10
PAIN_FUNCTIONAL_ASSESSMENT: 0-10

## 2023-10-19 NOTE — ANESTHESIA PROCEDURE NOTES
Airway  Date/Time: 10/19/2023 5:00 PM  Urgency: elective      Staffing  Performed: attending   Authorized by: Rebel Mancilla MD    Performed by: Rebel Mancilla MD  Patient location during procedure: OR    Indications and Patient Condition  Indications for airway management: anesthesia  Spontaneous ventilation: present  Sedation level: deep  Preoxygenated: yes  Patient position: sniffing  Mask difficulty assessment: 1 - vent by mask    Final Airway Details  Final airway type: supraglottic airway      Successful airway: Supreme  Size 5     Number of attempts at approach: 1

## 2023-10-19 NOTE — OP NOTE
Cystoscopy with Left RPG left ureteroscopy and left Insertion Stent Ureter (L), Cystoscopy with Retrograde Pyelogram (L) Operative Note     Date: 10/19/2023  OR Location: [facility] OR    Name: Jaime Liz Jr., : 1970, Age: 53 y.o., MRN: [id number], Sex: male    Diagnosis  Pre-op Diagnosis     * Ureteral calculus [N20.1] Post-op Diagnosis     * Ureteral calculus [N20.1]     Procedures  Cystoscopy with Left RPG left ureteroscopy and left Insertion Stent Ureter  03602 - FL CYSTO W/INSERT URETERAL STENT    Cystoscopy with Retrograde Pyelogram  37504 - CHG UROGRAPHY RETROGRADE WITH/WO KUB      Surgeons      * Alexis Corcoran - Primary    Resident/Fellow/Other Assistant:  Surgeon(s) and Role:    Procedure Summary  Anesthesia: General  ASA: II  Anesthesia Staff: Anesthesiologist: Rebel Mancilla MD  Estimated Blood Loss: 0mL  Intra-op Medications:   Medication Name Total Dose   iohexol (OMNIPaque) 300 mg iodine/mL solution 10 mL              Anesthesia Record               Intraprocedure I/O Totals       None           Specimen: No specimens collected     Staff:   Circulator: Alice Brambila RN  Scrub Person: Mason Calle RN         Drains and/or Catheters: * None in log *    Tourniquet Times:         Implants:  Implants       Type Name Action Serial No.      Shunt STENT, POLARIS ULTRA  5FR 24CM, DISPOSABLE - [id number] Implanted                   Indications: Jaime Liz Jr. is an 53 y.o. male who is having surgery for Ureteral calculus [N20.1].     The patient was seen in the preoperative area. The risks, benefits, complications, treatment options, non-operative alternatives, expected recovery and outcomes were discussed with the patient. The possibilities of reaction to medication, pulmonary aspiration, injury to surrounding structures, bleeding, recurrent infection, the need for additional procedures, failure to diagnose a condition, and creating a complication requiring transfusion or operation were discussed  with the patient. The patient concurred with the proposed plan, giving informed consent.  The site of surgery was properly noted/marked if necessary per policy. The patient has been actively warmed in preoperative area.     Preoperative diagnosis: Left ureteral stone  Postoperative diagnosis: Same  Procedure: cystoscopy with Left RPG and ureteroscopy and Left stent insertion  Physician: ROLANDO  Anesthesia: Gen.  Estimated blood loss: None  Indications and consent: The patient presents for treatment of a left ureteral stone. After the risks, benefits, alternatives, and indications of the procedure were explained to the patient they consented.  Procedure: The patient was brought to the operating room and placed on the table in the supine position. After adequate anesthesia was obtained, the patient was prepped and draped in the standard surgical fashion. First, the cystoscope was inserted into the bladder. Formal cystoscopy was performed.  A Left retrograde pyelogram was performed which suggested a filling defect in the ureter We then performed ureteroscopy however the ureteroscope was unable to reach the stone. The stone was irrigated out. A 5 x 24 stent was placed over the existing guidewire. This was done under fluoroscopic vision. Proper positioning was confirmed.The patient tolerated the procedure well and there no complications.     Attending Attestation: I was present and scrubbed for the entire procedure.    Alexis Corcoran  Phone Number: [phone]

## 2023-10-19 NOTE — ANESTHESIA POSTPROCEDURE EVALUATION
Patient: Jaime Liz Jr.    Procedure Summary       Date: 10/19/23 Room / Location: [facility] OR 01 / [facility] OR    Anesthesia Start: 1659 Anesthesia Stop: 1722    Procedures:       Cystoscopy with Left RPG left ureteroscopy and left Insertion Stent Ureter (Left)      Cystoscopy with Retrograde Pyelogram (Left) Diagnosis:       Ureteral calculus      (Ureteral calculus [N20.1])    Surgeons: Alexis Corcoran MD Responsible Provider: Rebel Mancilla MD    Anesthesia Type: general ASA Status: 2            Anesthesia Type: general    Vitals Value Taken Time   BP  10/19/23 1731   Temp  10/19/23 1731   Pulse  10/19/23 1731   Resp  10/19/23 1731   SpO2  10/19/23 1731       Anesthesia Post Evaluation    Patient location during evaluation: PACU  Patient participation: complete - patient participated  Level of consciousness: awake  Pain score: 2  Pain management: adequate  Airway patency: patent  Two or more strategies used to mitigate risk of obstructive sleep apnea  Cardiovascular status: acceptable  Respiratory status: acceptable  Hydration status: acceptable        No notable events documented.

## 2023-10-19 NOTE — BRIEF OP NOTE
Date: 10/19/2023  OR Location: [facility] OR    Name: Jaime Liz Jr., : 1970, Age: 53 y.o., MRN: [id number], Sex: male    Diagnosis  Pre-op Diagnosis     * Ureteral calculus [N20.1] Post-op Diagnosis     * Ureteral calculus [N20.1]     Procedures  Cystoscopy with Left RPG left ureteroscopy and left Insertion Stent Ureter  17644 - AL CYSTO W/INSERT URETERAL STENT    Cystoscopy with Retrograde Pyelogram  22022 - CHG UROGRAPHY RETROGRADE WITH/WO KUB      Surgeons      * Alexis Corcoran - Primary    Resident/Fellow/Other Assistant:  Surgeon(s) and Role:    Procedure Summary  Anesthesia: General  ASA: II  Anesthesia Staff: Anesthesiologist: Rebel Mancilla MD  Estimated Blood Loss: 0mL  Intra-op Medications: * No intraprocedure medications in log *           Anesthesia Record               Intraprocedure I/O Totals       None           Specimen: No specimens collected     Staff:   Circulator: Alice Brambila RN  Scrub Person: Mason Calle RN    Preoperative diagnosis: Left ureteral stone  Postoperative diagnosis: Same  Procedure: cystoscopy with Left RPG and ureteroscopy and Left stent insertion  Physician: ROLANDO  Anesthesia: Gen.  Estimated blood loss: None  Indications and consent: The patient presents for treatment of a left ureteral stone. After the risks, benefits, alternatives, and indications of the procedure were explained to the patient they consented.  Procedure: The patient was brought to the operating room and placed on the table in the supine position. After adequate anesthesia was obtained, the patient was prepped and draped in the standard surgical fashion. First, the cystoscope was inserted into the bladder. Formal cystoscopy was performed.  A Left retrograde pyelogram was performed which suggested a filling defect in the ureter We then performed ureteroscopy however the ureteroscope the stone was irrigated out. A 5 [place] open-ended catheter which allowed us to place the guidewire past the stone into  the renal pelvis. A 5 x 24 stent was placed over the existing guidewire. This was done under fluoroscopic vision. Proper positioning was confirmed.The patient tolerated the procedure well and there no complications.     Follow up stent removal, no KUB    Attending Attestation: I was present and scrubbed for the entire procedure.    Alexis Corcoran  Phone Number: [phone]

## 2023-10-19 NOTE — ANESTHESIA PREPROCEDURE EVALUATION
Patient: Jaime Liz Jr.    Procedure Information       Date/Time: 10/19/23 1630    Procedures:       Cystoscopy with Left RPG left ureteroscopy with holmium and left Insertion Stent Ureter (Left)      Cystoscopy with Retrograde Pyelogram (Left)    Location: [facility] OR 01 / [facility] OR    Surgeons: Alexis Corcoran MD            Relevant Problems   Cardiovascular   (+) Carotid artery narrowings   (+) Hyperlipidemia   (+) Hypertension      /Renal   (+) Kidney stone      Neuro/Psych   (+) Carotid artery narrowings   (+) Generalized anxiety disorder   (+) Major depressive disorder      Pulmonary   (+) Acute exacerbation of chronic obstructive airways disease (CMS/HCC)      Musculoskeletal   (+) Rheumatoid arthritis (CMS/HCC)      Infectious Disease   (+) Infected sebaceous cyst      Other   (+) Arthritis of right hip   (+) Psoriasis with arthropathy (CMS/HCC)   (+) Psoriatic arthritis mutilans (CMS/HCC)   (+) Rheumatoid arthritis (CMS/HCC)       Clinical information reviewed:   Tobacco  Allergies  Meds   Med Hx  Surg Hx   Fam Hx          NPO Detail:  No data recorded     Physical Exam    Airway  Mallampati: II  TM distance: >3 FB  Neck ROM: full     Cardiovascular   Rhythm: regular  Rate: normal     Dental    Pulmonary   Breath sounds clear to auscultation     Abdominal            Anesthesia Plan    ASA 2     general     The patient is not a current smoker.  Patient was not previously instructed to abstain from smoking on day of procedure.  Patient did not smoke on day of procedure.    intravenous induction   Postoperative administration of opioids is intended.  Anesthetic plan and risks discussed with patient.

## 2023-10-24 NOTE — PROGRESS NOTES
Patient ID: Jaime Liz Jr. is a 53 y.o. male.    Procedures   The patient was prepped using a Betadine solution. Lidocaine jelly was instilled into the urethra. The flexible cystoscope was sterilely inserted into the urethra and formal cystoscopy performed in a systematic fashion. . For detailed findings of the procedure, please see Dr. Corcoran remarks below  CIPRO 250MG POBID TIMES 3 DAYS GIVEN     STENT REMOVED. NORMAL CYSTO    F/U 3 MONTHS WITH KUB AND PSA

## 2023-10-25 ENCOUNTER — OFFICE VISIT (OUTPATIENT)
Dept: UROLOGY | Facility: CLINIC | Age: 53
End: 2023-10-25
Payer: COMMERCIAL

## 2023-10-25 VITALS — HEIGHT: 70 IN | BODY MASS INDEX: 26.77 KG/M2 | WEIGHT: 187 LBS

## 2023-10-25 DIAGNOSIS — N20.0 KIDNEY STONE: Primary | ICD-10-CM

## 2023-10-25 DIAGNOSIS — N20.1 LEFT URETERAL STONE: ICD-10-CM

## 2023-10-25 DIAGNOSIS — R35.1 NOCTURIA: ICD-10-CM

## 2023-10-25 PROCEDURE — 4004F PT TOBACCO SCREEN RCVD TLK: CPT | Performed by: UROLOGY

## 2023-10-25 PROCEDURE — 52310 CYSTOSCOPY AND TREATMENT: CPT | Performed by: UROLOGY

## 2023-10-25 RX ORDER — CIPROFLOXACIN 250 MG/1
250 TABLET, FILM COATED ORAL 2 TIMES DAILY
Qty: 6 TABLET | Refills: 0 | Status: SHIPPED | OUTPATIENT
Start: 2023-10-25 | End: 2023-10-28

## 2023-11-01 ENCOUNTER — APPOINTMENT (OUTPATIENT)
Dept: UROLOGY | Facility: CLINIC | Age: 53
End: 2023-11-01
Payer: COMMERCIAL

## 2023-11-06 ENCOUNTER — HOSPITAL ENCOUNTER (OUTPATIENT)
Dept: RADIOLOGY | Facility: HOSPITAL | Age: 53
Discharge: HOME | End: 2023-11-06
Payer: COMMERCIAL

## 2023-11-06 DIAGNOSIS — N20.1 LEFT URETERAL STONE: ICD-10-CM

## 2023-11-06 PROCEDURE — 74018 RADEX ABDOMEN 1 VIEW: CPT | Performed by: RADIOLOGY

## 2023-11-06 PROCEDURE — 74018 RADEX ABDOMEN 1 VIEW: CPT

## 2023-12-13 ENCOUNTER — LAB (OUTPATIENT)
Dept: LAB | Facility: LAB | Age: 53
End: 2023-12-13
Payer: COMMERCIAL

## 2023-12-13 DIAGNOSIS — R35.1 NOCTURIA: ICD-10-CM

## 2023-12-13 LAB — PSA SERPL-MCNC: 0.93 NG/ML

## 2023-12-13 PROCEDURE — 84153 ASSAY OF PSA TOTAL: CPT

## 2023-12-13 PROCEDURE — 36415 COLL VENOUS BLD VENIPUNCTURE: CPT

## 2023-12-27 ENCOUNTER — APPOINTMENT (OUTPATIENT)
Dept: UROLOGY | Facility: CLINIC | Age: 53
End: 2023-12-27
Payer: COMMERCIAL

## 2024-01-24 ENCOUNTER — APPOINTMENT (OUTPATIENT)
Dept: UROLOGY | Facility: CLINIC | Age: 54
End: 2024-01-24
Payer: COMMERCIAL

## 2024-01-26 ENCOUNTER — HOSPITAL ENCOUNTER (EMERGENCY)
Facility: HOSPITAL | Age: 54
Discharge: HOME | End: 2024-01-26
Attending: EMERGENCY MEDICINE
Payer: COMMERCIAL

## 2024-01-26 ENCOUNTER — APPOINTMENT (OUTPATIENT)
Dept: RADIOLOGY | Facility: HOSPITAL | Age: 54
End: 2024-01-26
Payer: COMMERCIAL

## 2024-01-26 VITALS
BODY MASS INDEX: 27.2 KG/M2 | RESPIRATION RATE: 18 BRPM | HEIGHT: 70 IN | SYSTOLIC BLOOD PRESSURE: 127 MMHG | WEIGHT: 190 LBS | TEMPERATURE: 97.7 F | DIASTOLIC BLOOD PRESSURE: 86 MMHG | HEART RATE: 86 BPM | OXYGEN SATURATION: 96 %

## 2024-01-26 DIAGNOSIS — R10.32 LEFT INGUINAL PAIN: Primary | ICD-10-CM

## 2024-01-26 LAB
ALBUMIN SERPL BCP-MCNC: 4.6 G/DL (ref 3.4–5)
ALP SERPL-CCNC: 80 U/L (ref 33–120)
ALT SERPL W P-5'-P-CCNC: 59 U/L (ref 10–52)
ANION GAP SERPL CALC-SCNC: 15 MMOL/L (ref 10–20)
APPEARANCE UR: CLEAR
AST SERPL W P-5'-P-CCNC: 52 U/L (ref 9–39)
BASOPHILS # BLD AUTO: 0.05 X10*3/UL (ref 0–0.1)
BASOPHILS NFR BLD AUTO: 0.7 %
BILIRUB SERPL-MCNC: 0.9 MG/DL (ref 0–1.2)
BILIRUB UR STRIP.AUTO-MCNC: NEGATIVE MG/DL
BUN SERPL-MCNC: 13 MG/DL (ref 6–23)
CALCIUM SERPL-MCNC: 8.6 MG/DL (ref 8.6–10.3)
CHLORIDE SERPL-SCNC: 104 MMOL/L (ref 98–107)
CK SERPL-CCNC: 310 U/L (ref 0–325)
CO2 SERPL-SCNC: 24 MMOL/L (ref 21–32)
COLOR UR: YELLOW
CREAT SERPL-MCNC: 0.92 MG/DL (ref 0.5–1.3)
EGFRCR SERPLBLD CKD-EPI 2021: >90 ML/MIN/1.73M*2
EOSINOPHIL # BLD AUTO: 0.14 X10*3/UL (ref 0–0.7)
EOSINOPHIL NFR BLD AUTO: 2 %
ERYTHROCYTE [DISTWIDTH] IN BLOOD BY AUTOMATED COUNT: 14.4 % (ref 11.5–14.5)
GLUCOSE SERPL-MCNC: 96 MG/DL (ref 74–99)
GLUCOSE UR STRIP.AUTO-MCNC: NEGATIVE MG/DL
HCT VFR BLD AUTO: 44.7 % (ref 41–52)
HGB BLD-MCNC: 14.8 G/DL (ref 13.5–17.5)
HOLD SPECIMEN: NORMAL
IMM GRANULOCYTES # BLD AUTO: 0.02 X10*3/UL (ref 0–0.7)
IMM GRANULOCYTES NFR BLD AUTO: 0.3 % (ref 0–0.9)
KETONES UR STRIP.AUTO-MCNC: NEGATIVE MG/DL
LEUKOCYTE ESTERASE UR QL STRIP.AUTO: NEGATIVE
LYMPHOCYTES # BLD AUTO: 2.11 X10*3/UL (ref 1.2–4.8)
LYMPHOCYTES NFR BLD AUTO: 30.8 %
MCH RBC QN AUTO: 30.8 PG (ref 26–34)
MCHC RBC AUTO-ENTMCNC: 33.1 G/DL (ref 32–36)
MCV RBC AUTO: 93 FL (ref 80–100)
MONOCYTES # BLD AUTO: 0.39 X10*3/UL (ref 0.1–1)
MONOCYTES NFR BLD AUTO: 5.7 %
MUCOUS THREADS #/AREA URNS AUTO: NORMAL /LPF
NEUTROPHILS # BLD AUTO: 4.14 X10*3/UL (ref 1.2–7.7)
NEUTROPHILS NFR BLD AUTO: 60.5 %
NITRITE UR QL STRIP.AUTO: NEGATIVE
NRBC BLD-RTO: 0 /100 WBCS (ref 0–0)
PH UR STRIP.AUTO: 6 [PH]
PLATELET # BLD AUTO: 271 X10*3/UL (ref 150–450)
POTASSIUM SERPL-SCNC: 4.1 MMOL/L (ref 3.5–5.3)
PROT SERPL-MCNC: 7.1 G/DL (ref 6.4–8.2)
PROT UR STRIP.AUTO-MCNC: ABNORMAL MG/DL
RBC # BLD AUTO: 4.8 X10*6/UL (ref 4.5–5.9)
RBC # UR STRIP.AUTO: NEGATIVE /UL
RBC #/AREA URNS AUTO: NORMAL /HPF
SODIUM SERPL-SCNC: 139 MMOL/L (ref 136–145)
SP GR UR STRIP.AUTO: 1.02
UROBILINOGEN UR STRIP.AUTO-MCNC: <2 MG/DL
WBC # BLD AUTO: 6.9 X10*3/UL (ref 4.4–11.3)
WBC #/AREA URNS AUTO: NORMAL /HPF

## 2024-01-26 PROCEDURE — 74177 CT ABD & PELVIS W/CONTRAST: CPT | Performed by: RADIOLOGY

## 2024-01-26 PROCEDURE — 82550 ASSAY OF CK (CPK): CPT | Performed by: EMERGENCY MEDICINE

## 2024-01-26 PROCEDURE — 80053 COMPREHEN METABOLIC PANEL: CPT | Performed by: EMERGENCY MEDICINE

## 2024-01-26 PROCEDURE — 76870 US EXAM SCROTUM: CPT | Performed by: RADIOLOGY

## 2024-01-26 PROCEDURE — 99285 EMERGENCY DEPT VISIT HI MDM: CPT | Mod: 25

## 2024-01-26 PROCEDURE — 96374 THER/PROPH/DIAG INJ IV PUSH: CPT

## 2024-01-26 PROCEDURE — 74177 CT ABD & PELVIS W/CONTRAST: CPT

## 2024-01-26 PROCEDURE — 2500000004 HC RX 250 GENERAL PHARMACY W/ HCPCS (ALT 636 FOR OP/ED): Performed by: EMERGENCY MEDICINE

## 2024-01-26 PROCEDURE — 2550000001 HC RX 255 CONTRASTS: Performed by: EMERGENCY MEDICINE

## 2024-01-26 PROCEDURE — 36415 COLL VENOUS BLD VENIPUNCTURE: CPT | Performed by: EMERGENCY MEDICINE

## 2024-01-26 PROCEDURE — 85025 COMPLETE CBC W/AUTO DIFF WBC: CPT | Performed by: EMERGENCY MEDICINE

## 2024-01-26 PROCEDURE — 96361 HYDRATE IV INFUSION ADD-ON: CPT

## 2024-01-26 PROCEDURE — 81001 URINALYSIS AUTO W/SCOPE: CPT | Performed by: EMERGENCY MEDICINE

## 2024-01-26 PROCEDURE — 93975 VASCULAR STUDY: CPT

## 2024-01-26 PROCEDURE — 99285 EMERGENCY DEPT VISIT HI MDM: CPT | Performed by: EMERGENCY MEDICINE

## 2024-01-26 PROCEDURE — 96375 TX/PRO/DX INJ NEW DRUG ADDON: CPT

## 2024-01-26 RX ORDER — MORPHINE SULFATE 4 MG/ML
4 INJECTION, SOLUTION INTRAMUSCULAR; INTRAVENOUS ONCE
Status: COMPLETED | OUTPATIENT
Start: 2024-01-26 | End: 2024-01-26

## 2024-01-26 RX ORDER — KETOROLAC TROMETHAMINE 30 MG/ML
15 INJECTION, SOLUTION INTRAMUSCULAR; INTRAVENOUS ONCE
Status: COMPLETED | OUTPATIENT
Start: 2024-01-26 | End: 2024-01-26

## 2024-01-26 RX ORDER — CYCLOBENZAPRINE HCL 5 MG
5 TABLET ORAL 3 TIMES DAILY PRN
Qty: 12 TABLET | Refills: 0 | Status: SHIPPED | OUTPATIENT
Start: 2024-01-26 | End: 2024-01-30

## 2024-01-26 RX ORDER — ONDANSETRON HYDROCHLORIDE 2 MG/ML
4 INJECTION, SOLUTION INTRAVENOUS ONCE
Status: COMPLETED | OUTPATIENT
Start: 2024-01-26 | End: 2024-01-26

## 2024-01-26 RX ORDER — CYCLOBENZAPRINE HCL 5 MG
5 TABLET ORAL 3 TIMES DAILY PRN
Qty: 12 TABLET | Refills: 0 | Status: SHIPPED | OUTPATIENT
Start: 2024-01-26 | End: 2024-01-26 | Stop reason: SDUPTHER

## 2024-01-26 RX ORDER — KETOROLAC TROMETHAMINE 10 MG/1
10 TABLET, FILM COATED ORAL EVERY 6 HOURS PRN
Qty: 20 TABLET | Refills: 0 | Status: SHIPPED | OUTPATIENT
Start: 2024-01-26 | End: 2024-01-31

## 2024-01-26 RX ORDER — KETOROLAC TROMETHAMINE 10 MG/1
10 TABLET, FILM COATED ORAL EVERY 6 HOURS PRN
Qty: 20 TABLET | Refills: 0 | Status: SHIPPED | OUTPATIENT
Start: 2024-01-26 | End: 2024-01-26 | Stop reason: SDUPTHER

## 2024-01-26 RX ADMIN — SODIUM CHLORIDE 1000 ML: 9 INJECTION, SOLUTION INTRAVENOUS at 08:25

## 2024-01-26 RX ADMIN — MORPHINE SULFATE 4 MG: 4 INJECTION, SOLUTION INTRAMUSCULAR; INTRAVENOUS at 08:25

## 2024-01-26 RX ADMIN — ONDANSETRON 4 MG: 2 INJECTION INTRAMUSCULAR; INTRAVENOUS at 08:25

## 2024-01-26 RX ADMIN — KETOROLAC TROMETHAMINE 15 MG: 30 INJECTION, SOLUTION INTRAMUSCULAR; INTRAVENOUS at 10:14

## 2024-01-26 RX ADMIN — IOHEXOL 70 ML: 350 INJECTION, SOLUTION INTRAVENOUS at 09:17

## 2024-01-26 ASSESSMENT — PAIN DESCRIPTION - PAIN TYPE: TYPE: ACUTE PAIN

## 2024-01-26 ASSESSMENT — PAIN SCALES - GENERAL
PAINLEVEL_OUTOF10: 10 - WORST POSSIBLE PAIN
PAINLEVEL_OUTOF10: 10 - WORST POSSIBLE PAIN

## 2024-01-26 ASSESSMENT — PAIN - FUNCTIONAL ASSESSMENT
PAIN_FUNCTIONAL_ASSESSMENT: 0-10
PAIN_FUNCTIONAL_ASSESSMENT: 0-10

## 2024-01-26 ASSESSMENT — PAIN DESCRIPTION - ORIENTATION: ORIENTATION: RIGHT

## 2024-01-26 ASSESSMENT — PAIN DESCRIPTION - LOCATION: LOCATION: GROIN

## 2024-01-26 ASSESSMENT — PAIN DESCRIPTION - PROGRESSION: CLINICAL_PROGRESSION: NOT CHANGED

## 2024-01-26 NOTE — ED PROVIDER NOTES
HPI   No chief complaint on file.      Limitations to History: None    HPI: 53-year-old male presents with multiple complaints.  States that he has pain all over.  Main complaint is left groin pain as well as scrotal pain.  States is been present over the past 2 days.  Wife states that through the light last night he was complaining of this pain.  Sharp in nature.  States also he slipped in the snow and has pain in his right hip.  He denies any nausea, vomiting, abdominal pain, urinary symptoms.  Patient is an alcoholic and daily drinker.  Denies any head injury or loss of consciousness.  No anticoagulation.    Additional History Obtained from: Wife at the bedside.    ------------------------------------------------------------------------------------------------------------------------------------------  Physical Exam:    VS: As documented in the triage note and EMR flowsheet from this visit were reviewed.    Appearance: Alert. cooperative,  in no acute distress.   Skin: Intact,  dry skin, no lesions, rash, petechiae or purpura.   Eyes: PERRLA, EOMs intact,  Conjunctiva pink with no redness or exudates.   HENT: Normocephalic, atraumatic. Nares patent. No intraoral lesions.   Neck: Supple, without meningismus. Trachea at midline. No lymphadenopathy.  Pulmonary: Clear bilaterally with good chest wall excursion. No rales, rhonchi or wheezing. No accessory muscle use or stridor.  Cardiac: Regular rate and rhythm, no rubs, murmurs, or gallops.   Abdomen: Abdomen is soft, nontender, and nondistended.  No palpable organomegaly.  No rebound or guarding.  No CVA tenderness. Nonsurgical abdomen  Genitourinary: Exam deferred.  Musculoskeletal: Full range of motion.  Pulses full and equal. No cyanosis, clubbing, or edema.  Neurological:  Cranial nerves are grossly intact, grossly normal sensation, no weakness, no focal findings identified.  Psychiatric: Appropriate mood and affect.                            No data recorded                 Patient History   Past Medical History:   Diagnosis Date    Alcohol abuse     Arthritis     Chronic obstructive pulmonary disease, unspecified (CMS/HCC)     Chronic bronchitis, obstructive    Cutaneous abscess, unspecified 09/10/2021    Abscess    Hypertension     Personal history of diseases of the blood and blood-forming organs and certain disorders involving the immune mechanism     History of immunodeficiency    Personal history of other diseases of the musculoskeletal system and connective tissue     History of rheumatoid arthritis    Psoriatic arthritis mutilans (CMS/HCC)     Arthritis mutilans     Past Surgical History:   Procedure Laterality Date    JOINT REPLACEMENT Right 2019    OTHER SURGICAL HISTORY  09/10/2021    Colonoscopy    OTHER SURGICAL HISTORY  09/10/2021    Shoulder surgery    SHOULDER SURGERY Right     SKIN CANCER EXCISION      TESTICLE SURGERY       Family History   Problem Relation Name Age of Onset    Lung cancer Mother      Brain cancer Mother      Lung cancer Father      Throat cancer Father       Social History     Tobacco Use    Smoking status: Every Day     Packs/day: .5     Types: Cigarettes    Smokeless tobacco: Never   Vaping Use    Vaping Use: Never used   Substance Use Topics    Alcohol use: Yes     Comment: 1 pint vodka    Drug use: Never       Physical Exam   ED Triage Vitals   Temp Pulse Resp BP   -- -- -- --      SpO2 Temp src Heart Rate Source Patient Position   -- -- -- --      BP Location FiO2 (%)     -- --       Physical Exam    ED Course & MDM   Diagnoses as of 01/26/24 1043   Left inguinal pain       Medical Decision Making  CT abdomen pelvis w IV contrast   Final Result    1. Abnormal skin thickening with suggestion of ulceration over the    left lower anterior abdominal wall. Direct examination recommended.    2. Fatty liver.    3. Single nonobstructing right renal stone.                Signed by: Tristan Tineo 1/26/2024 10:10 AM    Dictation workstation:    [id number]     US scrotum w doppler   Final Result    Right varicocele.          MACRO:    None          Signed by: Mattie Dorsey 1/26/2024 8:42 AM    Dictation workstation:   [id number]     Labs Reviewed  COMPREHENSIVE METABOLIC PANEL - Abnormal     Glucose                       96                     Sodium                        139                    Potassium                     4.1                    Chloride                      104                    Bicarbonate                   24                     Anion Gap                     15                     Urea Nitrogen                 13                     Creatinine                    0.92                   eGFR                          >90                    Calcium                       8.6                    Albumin                       4.6                    Alkaline Phosphatase          80                     Total Protein                 7.1                    AST                           52 (*)                 Bilirubin, Total              0.9                    ALT                           59 (*)              URINALYSIS WITH REFLEX CULTURE AND MICROSCOPIC - Abnormal     Color, Urine                  Yellow                 Appearance, Urine             Clear                  Specific Gravity, Urine       1.019                  pH, Urine                     6.0                    Protein, Urine                30 (1+) (*)               Glucose, Urine                NEGATIVE                Blood, Urine                  NEGATIVE                Ketones, Urine                NEGATIVE                Bilirubin, Urine              NEGATIVE                Urobilinogen, Urine           <2.0                   Nitrite, Urine                NEGATIVE                Leukocyte Esterase, Urine     NEGATIVE             CREATINE KINASE - Normal     Creatine Kinase               310                 CBC WITH AUTO DIFFERENTIAL     WBC                           6.9                     nRBC                          0.0                    RBC                           4.80                   Hemoglobin                    14.8                   Hematocrit                    44.7                   MCV                           93                     MCH                           30.8                   MCHC                          33.1                   RDW                           14.4                   Platelets                     271                    Neutrophils %                 60.5                   Immature Granulocytes %, Automated   0.3                    Lymphocytes %                 30.8                   Monocytes %                   5.7                    Eosinophils %                 2.0                    Basophils %                   0.7                    Neutrophils Absolute          4.14                   Immature Granulocytes Absolute, Au*   0.02                   Lymphocytes Absolute          2.11                   Monocytes Absolute            0.39                   Eosinophils Absolute          0.14                   Basophils Absolute            0.05                URINALYSIS WITH REFLEX CULTURE AND MICROSCOPIC         Narrative: The following orders were created for panel order Urinalysis with Reflex Culture and Microscopic.                  Procedure                               Abnormality         Status                                     ---------                               -----------         ------                                     Urinalysis with Reflex C...[[id number]]  Abnormal            Final result                               Extra Urine Gray Tube[[id number]]                            In process                                                   Please view results for these tests on the individual orders.  EXTRA URINE GRAY TUBE  URINALYSIS MICROSCOPIC WITH REFLEX CULTURE    Medical Decision Making:    Patient appears well nontoxic.   Patient treated with intravenous morphine, Zofran, 1 L normal saline.  Lab work shows a slight transaminitis likely secondary to chronic alcohol abuse.  Scrotal ultrasound without acute pathology.  CT of the abdomen pelvis otherwise negative.  Urine shows no evidence of infection.  Patient continued to have pain and was treated with intravenous Toradol.  Patient be given oral Toradol and cyclobenzaprine at home with concern for muscle strain.  Advised on follow-up with primary care.  Stable at time of discharge.    Differential Diagnoses Considered: Muscle strain, kidney stone, diverticulitis, scrotal cellulitis    Independent Interpretation of Studies:  I independently interpreted: CT of the abdomen pelvis with IV contrast shows no intra-abdominal free air.    Escalation of Care:  Appropriate for discharge and follow-up with primary care.    Prescription Drug Consideration: Oral ketorolac and cyclobenzaprine.          Procedure  Procedures     Darwin Kramer DO  01/26/24 1045

## 2024-02-08 ENCOUNTER — HOSPITAL ENCOUNTER (OUTPATIENT)
Dept: RADIOLOGY | Facility: CLINIC | Age: 54
Discharge: HOME | End: 2024-02-08
Payer: COMMERCIAL

## 2024-02-08 ENCOUNTER — OFFICE VISIT (OUTPATIENT)
Dept: UROLOGY | Facility: CLINIC | Age: 54
End: 2024-02-08
Payer: COMMERCIAL

## 2024-02-08 VITALS — BODY MASS INDEX: 26.83 KG/M2 | RESPIRATION RATE: 16 BRPM | WEIGHT: 187 LBS

## 2024-02-08 DIAGNOSIS — R36.1 HEMATOSPERMIA: ICD-10-CM

## 2024-02-08 DIAGNOSIS — N20.0 KIDNEY STONE: ICD-10-CM

## 2024-02-08 DIAGNOSIS — R35.1 NOCTURIA: ICD-10-CM

## 2024-02-08 DIAGNOSIS — N52.9 ERECTILE DYSFUNCTION, UNSPECIFIED ERECTILE DYSFUNCTION TYPE: ICD-10-CM

## 2024-02-08 DIAGNOSIS — N50.819 PAIN IN TESTICLE, UNSPECIFIED LATERALITY: ICD-10-CM

## 2024-02-08 PROCEDURE — 74018 RADEX ABDOMEN 1 VIEW: CPT

## 2024-02-08 PROCEDURE — 74018 RADEX ABDOMEN 1 VIEW: CPT | Performed by: RADIOLOGY

## 2024-02-08 PROCEDURE — 99214 OFFICE O/P EST MOD 30 MIN: CPT | Performed by: UROLOGY

## 2024-02-08 RX ORDER — TADALAFIL 20 MG/1
20 TABLET ORAL DAILY PRN
Qty: 30 TABLET | Refills: 3 | Status: SHIPPED | OUTPATIENT
Start: 2024-02-08 | End: 2024-03-09

## 2024-02-08 RX ORDER — TADALAFIL 20 MG/1
20 TABLET ORAL DAILY PRN
Qty: 30 TABLET | Refills: 3 | Status: CANCELLED | OUTPATIENT
Start: 2024-02-08 | End: 2024-03-09

## 2024-02-08 ASSESSMENT — ENCOUNTER SYMPTOMS
CHILLS: 0
PSYCHIATRIC NEGATIVE: 1
FEVER: 0
ALLERGIC/IMMUNOLOGIC NEGATIVE: 1
NAUSEA: 0
SHORTNESS OF BREATH: 0
DIFFICULTY URINATING: 0
COUGH: 0
ENDOCRINE NEGATIVE: 1
EYES NEGATIVE: 1

## 2024-02-08 NOTE — PROGRESS NOTES
Subjective   Patient ID: Jaime Liz Jr. is a 53 y.o. male.    HPI  Patient is here for ER follow up. He was seen in ER on 1/26 for testicular pain. Scrotal US was done which showed right varicocele. Patient had Left Varicocele repair as a teenager by Dr Roe. CT was done as well which showed single 3MM nonobstructive right renal stone. Hx of kidney stones. S/P Left ureteroscopy on 10/23. PSA 12/23 was 0.93..He also has hematospermia. ED is mild.       Review of Systems   Constitutional:  Negative for chills and fever.   HENT: Negative.     Eyes: Negative.    Respiratory:  Negative for cough and shortness of breath.    Cardiovascular:  Negative for chest pain and leg swelling.   Gastrointestinal:  Negative for nausea.   Endocrine: Negative.    Genitourinary:  Negative for difficulty urinating.        Negative except for documented in HPI   Allergic/Immunologic: Negative.    Neurological:         Alert & oriented X 3   Hematological:         Denies blood thinners   Psychiatric/Behavioral: Negative.         Objective   Physical Exam  Vitals and nursing note reviewed.   Constitutional:       General: He is not in acute distress.     Appearance: Normal appearance.   Pulmonary:      Effort: Pulmonary effort is normal.   Abdominal:      Tenderness: There is no abdominal tenderness.   Genitourinary:     Comments: Kidneys non palpable bilaterally  Bladder non palpable or tender  Scrotum no mass, No hydrocele  Epididymis- No spermatocele. Non Tender.  Testicles: No mass  Urethra: No discharge  Penis within normal limits... No lesions. Circumcised  Prostate - deferred  Neurological:      Mental Status: He is alert.         Assessment/Plan   Diagnoses and all orders for this visit:  Kidney stone  Nocturia  Pain in testicle, unspecified laterality  Hematospermia      All available PSA values reviewed, Options discussed. Questions answered.   Diet changes for prostate health discussed and educational information  given. Pros/Cons of prostate health supplements discussed.   Treatment options for LUTS reviewed  Discussed timed voiding. Discussed fluid and caffeine intake  Treatment options for ED reviewed.  Cialis Rx given  Lifestyle change to help prevent UTIs discussed. Encouraged fluid intake.  U/S reviewed-Sx improved  CT reviewed  KUB ordered  Stone prevention discussed. Diet reviewed. Discussed fluid intake      F/U with KUB virtual

## 2024-02-09 ASSESSMENT — ENCOUNTER SYMPTOMS
DIFFICULTY URINATING: 0
ENDOCRINE NEGATIVE: 1
PSYCHIATRIC NEGATIVE: 1
SHORTNESS OF BREATH: 0
COUGH: 0
NAUSEA: 0
CHILLS: 0
EYES NEGATIVE: 1
ALLERGIC/IMMUNOLOGIC NEGATIVE: 1
FEVER: 0

## 2024-02-09 NOTE — PROGRESS NOTES
Subjective   Patient ID: Jaime Liz Jr. is a 53 y.o. male.    HPI  Patient is here for KUB results. He was seen Thursday for ER follup up. CT showed 3mm nonobstrutive right renal stone. Hx of kidney stones. S/P Left ureteroscopy on 10/23. PSA 12/23 was 0.93..He also has hematospermia. ED is mild.        Review of Systems   Constitutional:  Negative for chills and fever.   HENT: Negative.     Eyes: Negative.    Respiratory:  Negative for cough and shortness of breath.    Cardiovascular:  Negative for chest pain and leg swelling.   Gastrointestinal:  Negative for nausea.   Endocrine: Negative.    Genitourinary:  Negative for difficulty urinating.        Negative except for documented in HPI   Allergic/Immunologic: Negative.    Neurological:         Alert & oriented X 3   Hematological:         Denies blood thinners   Psychiatric/Behavioral: Negative.         Objective   Physical Exam  No PE done given the virtual nature of visit.   Assessment/Plan   Diagnoses and all orders for this visit:  Kidney stone  Nocturia  Ureteral calculus      All available PSA values reviewed, Options discussed. Questions answered.   Diet changes for prostate health discussed and educational information given. Pros/Cons of prostate health supplements discussed.   Treatment options for LUTS reviewed  Discussed timed voiding. Discussed fluid and caffeine intake  Treatment options for ED reviewed.  Lifestyle change to help prevent UTIs discussed. Encouraged fluid intake.  KUB reviewed    F/U with PSA and KUB 1 year

## 2024-02-12 ENCOUNTER — TELEMEDICINE (OUTPATIENT)
Dept: UROLOGY | Facility: CLINIC | Age: 54
End: 2024-02-12
Payer: COMMERCIAL

## 2024-02-12 DIAGNOSIS — R35.1 NOCTURIA: ICD-10-CM

## 2024-02-12 DIAGNOSIS — N20.1 URETERAL CALCULUS: ICD-10-CM

## 2024-02-12 DIAGNOSIS — N20.0 KIDNEY STONE: Primary | ICD-10-CM

## 2024-02-12 PROCEDURE — 99213 OFFICE O/P EST LOW 20 MIN: CPT | Performed by: UROLOGY

## 2024-06-03 ENCOUNTER — CLINICAL SUPPORT (OUTPATIENT)
Dept: SLEEP MEDICINE | Facility: CLINIC | Age: 54
End: 2024-06-03
Payer: COMMERCIAL

## 2024-06-03 VITALS
TEMPERATURE: 98.7 F | WEIGHT: 186.95 LBS | HEIGHT: 70 IN | DIASTOLIC BLOOD PRESSURE: 94 MMHG | BODY MASS INDEX: 26.76 KG/M2 | SYSTOLIC BLOOD PRESSURE: 134 MMHG

## 2024-06-03 DIAGNOSIS — G47.33 OSA (OBSTRUCTIVE SLEEP APNEA): ICD-10-CM

## 2024-06-03 ASSESSMENT — SLEEP AND FATIGUE QUESTIONNAIRES
HOW LIKELY ARE YOU TO NOD OFF OR FALL ASLEEP WHILE SITTING AND READING: HIGH CHANCE OF DOZING
HOW LIKELY ARE YOU TO NOD OFF OR FALL ASLEEP IN A CAR, WHILE STOPPED FOR A FEW MINUTES IN TRAFFIC: WOULD NEVER DOZE
HOW LIKELY ARE YOU TO NOD OFF OR FALL ASLEEP WHILE LYING DOWN TO REST IN THE AFTERNOON WHEN CIRCUMSTANCES PERMIT: HIGH CHANCE OF DOZING
HOW LIKELY ARE YOU TO NOD OFF OR FALL ASLEEP WHILE WATCHING TV: MODERATE CHANCE OF DOZING
ESS-CHAD TOTAL SCORE: 12
HOW LIKELY ARE YOU TO NOD OFF OR FALL ASLEEP WHEN YOU ARE A PASSENGER IN A CAR FOR AN HOUR WITHOUT A BREAK: SLIGHT CHANCE OF DOZING
HOW LIKELY ARE YOU TO NOD OFF OR FALL ASLEEP WHILE SITTING AND TALKING TO SOMEONE: SLIGHT CHANCE OF DOZING
SITING INACTIVE IN A PUBLIC PLACE LIKE A CLASS ROOM OR A MOVIE THEATER: SLIGHT CHANCE OF DOZING
HOW LIKELY ARE YOU TO NOD OFF OR FALL ASLEEP WHILE SITTING QUIETLY AFTER LUNCH WITHOUT ALCOHOL: SLIGHT CHANCE OF DOZING

## 2024-06-04 NOTE — PROGRESS NOTES
"[facility] NOTE:     Patient: Jaime Liz Jr.   MRN//AGE: [id number]  1970  53 y.o.   Technologist: MARY KAY Senior   Room: 4   Service Date: 6/3/2024        Sleep Testing Location: [place] [address]     [place]: 12    TECHNOLOGIST SLEEP STUDY PROCEDURE NOTE:   This sleep study is being conducted according to the policies and procedures outlined by the [employer] accreditation standards.  The sleep study procedure and processes involved during this appointment was explained to the patient and all questions were answered. The patient verbalized understanding.      The patient is a 53 y.o. year old male scheduled for a CPAP titration with montage of:  PAP MASTER .     The study that was ultimately completed was a CPAP titration with montage of:  PAP MASTER .    The full study Was completed.  Patient questionnaires completed?: yes     Consents signed? yes    Initial Fall Risk Screening:     Jaime has not fallen in the last 6 months. Jaime does not have a fear of falling. He does not need assistance with sitting, standing, or walking. he does not need assistance walking in his home. he does not need assistance in an unfamiliar setting. The patient is notusing an assistive device.     Brief Study observations: Room 4. Mr. Liz is unable to sleep supine due to hip pain. All stages of sleep observed. CPAP pressures were increased from 4cmH2o to [id number] to combat obstructive respiratory events with desaturations. [place] Spo2 77%. Mask was switched to full face (\"hybrid\") mask due to mouth breathing. The patient tolerated CPAP well and appeared to experience consolidated sleep. All stages of sleep were observed. NREM events appeared to respond well to [id number] but pressures were increased late in the study due to events associated with supine positioning and REM sleep.          If PAP, which was preferred mask/pressure/mode: CPAP- F&P [place] Full size small/medium      After the procedure, the patient was " informed to ensure followup with ordering clinician for testing results.      Technologist: MARY KAY Senior

## 2024-06-17 ENCOUNTER — OFFICE VISIT (OUTPATIENT)
Dept: URGENT CARE | Facility: CLINIC | Age: 54
End: 2024-06-17
Payer: COMMERCIAL

## 2024-06-17 VITALS
WEIGHT: 186 LBS | OXYGEN SATURATION: 98 % | TEMPERATURE: 97.7 F | RESPIRATION RATE: 16 BRPM | HEART RATE: 129 BPM | HEIGHT: 70 IN | BODY MASS INDEX: 26.63 KG/M2 | SYSTOLIC BLOOD PRESSURE: 134 MMHG | DIASTOLIC BLOOD PRESSURE: 88 MMHG

## 2024-06-17 DIAGNOSIS — M24.812 INTERNAL DERANGEMENT OF LEFT SHOULDER: ICD-10-CM

## 2024-06-17 DIAGNOSIS — M25.512 PAIN OF LEFT STERNOCLAVICULAR JOINT: Primary | ICD-10-CM

## 2024-06-17 PROCEDURE — 99213 OFFICE O/P EST LOW 20 MIN: CPT | Performed by: PHYSICIAN ASSISTANT

## 2024-06-17 RX ORDER — MELOXICAM 15 MG/1
15 TABLET ORAL DAILY
Qty: 30 TABLET | Refills: 0 | Status: SHIPPED | OUTPATIENT
Start: 2024-06-17 | End: 2024-07-17

## 2024-06-17 RX ORDER — OMEPRAZOLE 20 MG/1
20 TABLET, DELAYED RELEASE ORAL DAILY
Qty: 30 TABLET | Refills: 0 | Status: SHIPPED | OUTPATIENT
Start: 2024-06-17 | End: 2024-07-17

## 2024-06-17 RX ORDER — HYDROCODONE BITARTRATE AND ACETAMINOPHEN 5; 325 MG/1; MG/1
1 TABLET ORAL EVERY 6 HOURS
Qty: 12 TABLET | Refills: 0 | Status: SHIPPED | OUTPATIENT
Start: 2024-06-17 | End: 2024-06-20

## 2024-06-17 NOTE — PROGRESS NOTES
[facility] URGENT CARE OTF NOTE:      Name: Jaime Liz Jr., 53 y.o.    CSN:[id number]   MRN:[id number]    PCP: Rashid Deleon PA-C    ALL:    Allergies   Allergen Reactions    Oxycodone Itching    Percocet [Oxycodone-Acetaminophen] Itching    Bactrim [Sulfamethoxazole-Trimethoprim] Nausea/vomiting    Celebrex [Celecoxib] Itching       History:    Chief Complaint: Shoulder Pain (Pain in left shoulder after altercation with inmates. )    Encounter Date: 6/17/2024  1115    HPI: The history was obtained from the patient. Jaime is a 53 y.o. male, who presents with a chief complaint of Shoulder Pain (Pain in left shoulder after altercation with inmates. 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  Exact mechanism of injury is unclear but colleagues report hearing an audible pop. He has been having constant pain since, worse with any activity. His pain worsened after playing golf this past Saturday, and he was unable to finish the last few holes.     He is severely limited in activity due to pain and decreased ROM. He has associated weakness of the left hand but denies any paresthesias of the left upper extremity.     This will be his first injury of report, he did have an evaluation by his Rheumatologist, he had Tramadol given to him at 50mg, and he intended to see Dr. Mirza Barrera, but he mentions this did not occur.    PMHx:    Past Medical History:   Diagnosis Date    Alcohol abuse     Arthritis     Chronic obstructive pulmonary disease, unspecified (Multi)     Chronic bronchitis, obstructive    Cutaneous abscess, unspecified 09/10/2021    Abscess    Hypertension     Personal history of diseases of the blood and blood-forming organs and certain disorders involving the immune mechanism     History of immunodeficiency    Personal history of other diseases of the musculoskeletal  system and connective tissue     History of rheumatoid arthritis    Psoriatic arthritis mutilans (Multi)     Arthritis mutilans              Current Outpatient Medications   Medication Sig Dispense Refill    acyclovir (Zovirax) 400 mg tablet Take 1 tablet (400 mg) by mouth once daily.      albuterol 90 mcg/actuation inhaler Inhale 2 puffs every 4 hours if needed.      amLODIPine (Norvasc) 10 mg tablet Take 1 tablet (10 mg) by mouth once daily.      armodafinil (Nuvigil) 200 mg tablet Take 1 tablet (200 mg) by mouth once daily.      atorvastatin (Lipitor) 80 mg tablet Take 1 tablet (80 mg) by mouth once daily.      busPIRone (Buspar) 15 mg tablet Take 1 tablet (15 mg) by mouth. TAKE PER DIRECTED      cloNIDine (Catapres) 0.1 mg tablet Take 1 tablet (0.1 mg) by mouth 2 times a day.      escitalopram (Lexapro) 10 mg tablet Take 1 tablet (10 mg) by mouth once daily.      gabapentin (Neurontin) 600 mg tablet Take 1 tablet (600 mg) by mouth 3 times a day. TAKE PER DIRECTED      HYDROcodone-acetaminophen (Norco) 5-325 mg tablet Take 1 tablet by mouth every 6 hours for 3 days. 12 tablet 0    hydrOXYzine pamoate (Vistaril) 25 mg capsule Take 1 capsule (25 mg) by mouth 3 times a day as needed for itching.      lisinopril 20 mg tablet Take 1 tablet (20 mg) by mouth once daily.      meloxicam (Mobic) 15 mg tablet Take 1 tablet (15 mg) by mouth once daily. 30 tablet 0    omeprazole OTC (PriLOSEC OTC) 20 mg EC tablet Take 1 tablet (20 mg) by mouth once daily. Do not crush, chew, or split. 30 tablet 0    ondansetron ODT (Zofran-ODT) 4 mg disintegrating tablet Take 1 tablet (4 mg) by mouth every 8 hours if needed for nausea or vomiting. 30 tablet 0    phenazopyridine (Pyridium) 200 mg tablet Take 1 tablet (200 mg) by mouth 3 times a day as needed for bladder spasms. 30 tablet 0    primidone (Mysoline) 50 mg tablet Take 0.5 tablets (25 mg) by mouth once daily at bedtime.      secukinumab (Cosentyx) 150 mg/mL syringe Inject 1 mL (150  mg) under the skin. TAKE PER DIRECTED      tadalafil (Cialis) 20 mg tablet Take 1 tablet (20 mg) by mouth once daily as needed for erectile dysfunction. 30 tablet 3    topiramate (Topamax) 50 mg tablet Take 1 tablet (50 mg) by mouth once daily.      traZODone (Desyrel) 50 mg tablet Take 1 tablet (50 mg) by mouth once daily.      triamcinolone (Kenalog) 0.1 % cream Apply topically twice a day. APPLY PER DIRECTED       No current facility-administered medications for this visit.         PMSx:    Past Surgical History:   Procedure Laterality Date    JOINT REPLACEMENT Right 2019    OTHER SURGICAL HISTORY  09/10/2021    Colonoscopy    OTHER SURGICAL HISTORY  09/10/2021    Shoulder surgery    SHOULDER SURGERY Right     SKIN CANCER EXCISION      TESTICLE SURGERY         Fam Hx:   Family History   Problem Relation Name Age of Onset    Lung cancer Mother      Brain cancer Mother      Lung cancer Father      Throat cancer Father         SOC. Hx:     Social History     Socioeconomic History    Marital status: Single     Spouse name: Not on file    Number of children: Not on file    Years of education: Not on file    Highest education level: Not on file   Occupational History    Not on file   Tobacco Use    Smoking status: Every Day     Current packs/day: 0.50     Types: Cigarettes    Smokeless tobacco: Never   Vaping Use    Vaping status: Never Used   Substance and Sexual Activity    Alcohol use: Yes     Comment: 1 pint vodka    Drug use: Never    Sexual activity: Not on file   Other Topics Concern    Not on file   Social History Narrative    Not on file     Social Determinants of Health     Financial Resource Strain: Low Risk  (3/1/2024)    Received from [facility]    Overall Financial Resource Strain (CARDIA)     Difficulty of Paying Living Expenses: Not very hard   Food Insecurity: No Food Insecurity (3/1/2024)    Received from [facility]    Hunger Vital Sign     Worried About Running Out of Food in the Last Year: Never  true     Ran Out of Food in the Last Year: Never true   Transportation Needs: No Transportation Needs (3/1/2024)    Received from [facility]    PRAPARE - Transportation     Lack of Transportation (Medical): No     Lack of Transportation (Non-Medical): No   Physical Activity: Not on file   Stress: Not on file   Social Connections: Not on file   Intimate Partner Violence: Not At Risk (1/28/2024)    Received from [facility]    Humiliation, Afraid, Rape, and Kick questionnaire     Fear of Current or Ex-Partner: No     Emotionally Abused: No     Physically Abused: No     Sexually Abused: No   Housing Stability: Low Risk  (1/28/2024)    Received from [facility]    Housing Stability Vital Sign     Unable to Pay for Housing in the Last Year: No     Number of Places Lived in the Last Year: 1     Unstable Housing in the Last Year: No         Vitals:    06/17/24 1025   BP: 134/88   Pulse: (!) 129   Resp: 16   Temp: 36.5 °C (97.7 °F)   SpO2: 98%     84.4 kg (186 lb)          Physical Exam  Constitutional:       Appearance: Normal appearance.   HENT:      Head: Normocephalic and atraumatic.   Eyes:      Extraocular Movements: Extraocular movements intact.      Conjunctiva/sclera: Conjunctivae normal.      Pupils: Pupils are equal, round, and reactive to light.   Cardiovascular:      Rate and Rhythm: Regular rhythm. Tachycardia present.      Pulses: Normal pulses.      Heart sounds: Normal heart sounds.      Comments: Repeat HR < 110bpm while seated during exam.  Pulmonary:      Effort: Pulmonary effort is normal.   Chest:       Musculoskeletal:      Right shoulder: Normal.      Left shoulder: Tenderness and bony tenderness present. No swelling, deformity, laceration or crepitus. Decreased range of motion. Normal pulse.      Left elbow: Normal.      Left wrist: Normal.      Cervical back: Normal range of motion.      Comments: Sensation of L upper extremity in tact. Normal pulses. Weakness of the hand but otherwise normal  strength of the upper extremities. Decreased flexion of the arm at the shoulder est 120 degrees with significant pain. ROM with external rotation of shoulder is intact but painful. Shoulder, scapula and AC joint shows poorly localized tenderness to palpation. Tenderness on the medial clavicle.    Lymphadenopathy:      Cervical: No cervical adenopathy.      Upper Body:      Right upper body: No supraclavicular adenopathy.      Left upper body: No supraclavicular adenopathy.   Skin:     General: Skin is warm and dry.      Capillary Refill: Capillary refill takes less than 2 seconds.      Findings: No abrasion, bruising, erythema, laceration or rash.             Comments: 4 cm soft, nontender mobile mass on the left anterolateral shoulder likely a lipoma. Overlying skin on the chest, back, left extremity is normal without erythema or warmth. Draining nontender cyst vs. Sweat gland abscess on the LLQ.    Neurological:      General: No focal deficit present.      Mental Status: He is alert and oriented to person, place, and time.   Psychiatric:         Mood and Affect: Mood normal.         Behavior: Behavior normal.         Thought Content: Thought content normal.         LABORATORY @ RADIOLOGICAL IMAGING (if done):     Reviewed images from Rheumatology visit of clavicle & again a Chest XR completed at [facility].    ____________________________________________________________________    I did personally review Jaime's past medical history, surgical history, social history, as well as family history (when relevant).  In this case, I also oversaw the his drug management by reviewing his medication list, allergy list, as well as the medications that I prescribed during the UC course and/or recommended as an out-patient (including possible OTC medications such as acetaminophen, NSAIDs , etc).    After reviewing the items above, I did look at previous medical documentation, such as recent hospitalizations, office visits,  and/or recent consultations with PCP/specialist.                          SDOH:   Another factor that I considered in Jaime's care was his Social Determinants of Health (SDOH). During this UC encounter, he did not have social determinants of health. Those SDOH influencing Jaime's care are: none      UC COURSE/MEDICAL DECISION MAKING:    Jaime is a 53 y.o., who presents with a working diagnosis of   1. Pain of left sternoclavicular joint    2. Internal derangement of left shoulder     with a differential to include: clavicular fracture, anterior shoulder dislocation, rotator cuff tear, rotator cuff tendinopathy, labrum tear, humeral head fracture, AC joint separation, subacromial bursitis, osteoarthritis, lipoma     He has a current Toradol rx but rarely uses due to GI upset. He has mild side effects with oxycodone but is willing to try a different narcotic due to his level of pain. Patient had a significant reaction following a high dose course of prednisone which resulted in 72 admission to a psychiatric facility. He does not want to use any form of steroid for treatment. He is agreeable to try Norco and Meloxicam with concurrent use of a PPI for any GI symptoms.     Jaime was seen by rheumatology for a tender lump on the medial clavicle and sternoclavicular joint tenderness radiating pain to the sternum and shoulder. Xray of the left clavicle was done prior to shoulder injury 5/9 showing some degenerative changes of the left shoulder but no acute injury of the shoulder or clavicle. MRI is needed to further evaluate for soft tissue injury.     There is no evidence of vascular compromise on exam. Pulses and skin perfusion were normal on exam. There are no palpable step offs or deformities that would suggest a dislocation. Shoulder strength with flexion, external rotation and adduction is in tact which may lower likelihood of a rotator cuff tear. However, pain elicited with these movements and on palpation  warrants further evaluation for soft tissue injury.     Workers comp forms have been completed and he plans to call to have occupational exam to continue the process. He has been instructed to refrain from any activity that requires use of the left arm. Left arm was immobilized in splint today.     Note initiated by:  ZACARIAS Andrews-Student, [facility]     Supervised by  Kin Capellan PA-C   Advanced Practice Provider  [facility] URGENT CARE    I was present with the PA student who participated in the documentation of this note. I have personally seen and re-examined the patient and performed the medical decision-making components (assessment and plan of care). I have reviewed the PA student documentation and verified the findings in the note as written with additions or exceptions as stated in the body of this note.    Kin Capellan PA-C

## 2024-06-21 ENCOUNTER — HOSPITAL ENCOUNTER (OUTPATIENT)
Dept: RADIOLOGY | Facility: HOSPITAL | Age: 54
Discharge: HOME | End: 2024-06-21
Payer: COMMERCIAL

## 2024-06-21 DIAGNOSIS — M24.812 INTERNAL DERANGEMENT OF LEFT SHOULDER: ICD-10-CM

## 2024-06-21 DIAGNOSIS — M25.512 PAIN OF LEFT STERNOCLAVICULAR JOINT: ICD-10-CM

## 2024-06-21 PROCEDURE — 73221 MRI JOINT UPR EXTREM W/O DYE: CPT | Mod: LT

## 2024-06-24 ENCOUNTER — TELEPHONE (OUTPATIENT)
Dept: URGENT CARE | Facility: CLINIC | Age: 54
End: 2024-06-24
Payer: COMMERCIAL

## 2024-06-24 NOTE — TELEPHONE ENCOUNTER
----- Message from Kin Capellan PA-C sent at 6/24/2024  9:15 AM EDT -----  Please call the patient regarding his abnormal result. Please indicate the findings warrant an evaluation with an Orthopedist. We are able to refer to  Eran Wilson, who may discuss further treatment & likely surgery. Also he does need to follow through with the occupational health clinic in [place] = [facility] for further restrictions.

## 2024-06-24 NOTE — TELEPHONE ENCOUNTER
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      COMPARISON:  None      ACCESSION NUMBER(S):  [id number]      ORDERING CLINICIAN:  SERGEY SMITH      TECHNIQUE:  Routine multiplanar multisequential MRI left shoulder without  contrast.      FINDINGS:  Evaluation of the rotator cuff demonstrates some mild supraspinatus  tendinosis without tear. There is also some tendinosis of the  superior fibers of the subscapularis. Remainder of the rotator cuff  musculature normal. No atrophy or edema.      Biceps intact.      Posteroinferior and inferior labral tear with a sizable inferior  paralabral cyst measuring 2 cm in size. No evidence of quadrilateral  space entrapment changes.      There is also linear tearing of the posterosuperior labrum with tiny  multilocular 1-2 mm paralabral cysts.      No evidence of fracture.      No marrow replacement lesion. Some hematopoietic marrow noted in the  proximal humerus.      No sizeable effusion.      No glenohumeral ligamentous tear.      Moderate acromioclavicular arthrosis causing some mass effect of the  supraspinatus myotendinous junction.      There is some edema in the subcoracoid bursa superficial to the  subscapularis musculature.        Impression    Fairly extensive labral tearing involving the posterosuperior,  posteroinferior, and inferior glenoid labrum with a sizable 2 cm  inferior paralabral cyst.      Moderate acromioclavicular osteoarthritis.      Mild subcoracoid bursitis.      Signed by: Bebeto Parra 6/22/2024 7:23 AM  Dictation workstation:   [id number]       12:34 PM      Results were not successfully communicated with the patient and they did  not acknowledge their understanding.

## 2024-07-02 ENCOUNTER — APPOINTMENT (OUTPATIENT)
Dept: PULMONOLOGY | Facility: CLINIC | Age: 54
End: 2024-07-02
Payer: COMMERCIAL

## 2024-07-02 ENCOUNTER — APPOINTMENT (OUTPATIENT)
Dept: RADIOLOGY | Facility: HOSPITAL | Age: 54
End: 2024-07-02
Payer: COMMERCIAL

## 2024-07-05 ENCOUNTER — APPOINTMENT (OUTPATIENT)
Dept: RADIOLOGY | Facility: HOSPITAL | Age: 54
End: 2024-07-05
Payer: COMMERCIAL

## 2024-07-05 ENCOUNTER — HOSPITAL ENCOUNTER (EMERGENCY)
Facility: HOSPITAL | Age: 54
Discharge: HOME | End: 2024-07-05
Attending: EMERGENCY MEDICINE
Payer: COMMERCIAL

## 2024-07-05 VITALS
SYSTOLIC BLOOD PRESSURE: 126 MMHG | RESPIRATION RATE: 17 BRPM | WEIGHT: 180 LBS | TEMPERATURE: 98.7 F | HEIGHT: 70 IN | HEART RATE: 77 BPM | DIASTOLIC BLOOD PRESSURE: 90 MMHG | BODY MASS INDEX: 25.77 KG/M2 | OXYGEN SATURATION: 98 %

## 2024-07-05 DIAGNOSIS — F10.10 ALCOHOL ABUSE: ICD-10-CM

## 2024-07-05 DIAGNOSIS — E87.6 HYPOKALEMIA: ICD-10-CM

## 2024-07-05 DIAGNOSIS — N49.2 CELLULITIS OF SCROTUM: ICD-10-CM

## 2024-07-05 DIAGNOSIS — L02.214 INGUINAL ABSCESS: Primary | ICD-10-CM

## 2024-07-05 LAB
ALBUMIN SERPL BCP-MCNC: 4.1 G/DL (ref 3.4–5)
ALP SERPL-CCNC: 92 U/L (ref 33–120)
ALT SERPL W P-5'-P-CCNC: 63 U/L (ref 10–52)
ANION GAP SERPL CALC-SCNC: 14 MMOL/L (ref 10–20)
APPEARANCE UR: CLEAR
AST SERPL W P-5'-P-CCNC: 83 U/L (ref 9–39)
BASOPHILS # BLD AUTO: 0.03 X10*3/UL (ref 0–0.1)
BASOPHILS NFR BLD AUTO: 0.4 %
BILIRUB SERPL-MCNC: 0.7 MG/DL (ref 0–1.2)
BILIRUB UR STRIP.AUTO-MCNC: NEGATIVE MG/DL
BUN SERPL-MCNC: 14 MG/DL (ref 6–23)
CALCIUM SERPL-MCNC: 8.4 MG/DL (ref 8.6–10.3)
CHLORIDE SERPL-SCNC: 102 MMOL/L (ref 98–107)
CO2 SERPL-SCNC: 25 MMOL/L (ref 21–32)
COLOR UR: YELLOW
CREAT SERPL-MCNC: 0.84 MG/DL (ref 0.5–1.3)
EGFRCR SERPLBLD CKD-EPI 2021: >90 ML/MIN/1.73M*2
EOSINOPHIL # BLD AUTO: 0.16 X10*3/UL (ref 0–0.7)
EOSINOPHIL NFR BLD AUTO: 2.1 %
ERYTHROCYTE [DISTWIDTH] IN BLOOD BY AUTOMATED COUNT: 13.2 % (ref 11.5–14.5)
GLUCOSE SERPL-MCNC: 91 MG/DL (ref 74–99)
GLUCOSE UR STRIP.AUTO-MCNC: NORMAL MG/DL
HCT VFR BLD AUTO: 40.9 % (ref 41–52)
HGB BLD-MCNC: 13.8 G/DL (ref 13.5–17.5)
HOLD SPECIMEN: NORMAL
IMM GRANULOCYTES # BLD AUTO: 0.02 X10*3/UL (ref 0–0.7)
IMM GRANULOCYTES NFR BLD AUTO: 0.3 % (ref 0–0.9)
KETONES UR STRIP.AUTO-MCNC: ABNORMAL MG/DL
LEUKOCYTE ESTERASE UR QL STRIP.AUTO: NEGATIVE
LYMPHOCYTES # BLD AUTO: 1.66 X10*3/UL (ref 1.2–4.8)
LYMPHOCYTES NFR BLD AUTO: 21.9 %
MCH RBC QN AUTO: 31.6 PG (ref 26–34)
MCHC RBC AUTO-ENTMCNC: 33.7 G/DL (ref 32–36)
MCV RBC AUTO: 94 FL (ref 80–100)
MONOCYTES # BLD AUTO: 0.66 X10*3/UL (ref 0.1–1)
MONOCYTES NFR BLD AUTO: 8.7 %
MUCOUS THREADS #/AREA URNS AUTO: NORMAL /LPF
NEUTROPHILS # BLD AUTO: 5.04 X10*3/UL (ref 1.2–7.7)
NEUTROPHILS NFR BLD AUTO: 66.6 %
NITRITE UR QL STRIP.AUTO: NEGATIVE
NRBC BLD-RTO: 0 /100 WBCS (ref 0–0)
PH UR STRIP.AUTO: 6 [PH]
PLATELET # BLD AUTO: 148 X10*3/UL (ref 150–450)
POTASSIUM SERPL-SCNC: 3.3 MMOL/L (ref 3.5–5.3)
PROT SERPL-MCNC: 6.9 G/DL (ref 6.4–8.2)
PROT UR STRIP.AUTO-MCNC: ABNORMAL MG/DL
RBC # BLD AUTO: 4.37 X10*6/UL (ref 4.5–5.9)
RBC # UR STRIP.AUTO: ABNORMAL /UL
RBC #/AREA URNS AUTO: NORMAL /HPF
SODIUM SERPL-SCNC: 138 MMOL/L (ref 136–145)
SP GR UR STRIP.AUTO: 1.02
UROBILINOGEN UR STRIP.AUTO-MCNC: NORMAL MG/DL
WBC # BLD AUTO: 7.6 X10*3/UL (ref 4.4–11.3)
WBC #/AREA URNS AUTO: NORMAL /HPF

## 2024-07-05 PROCEDURE — 96365 THER/PROPH/DIAG IV INF INIT: CPT | Mod: 59

## 2024-07-05 PROCEDURE — 76870 US EXAM SCROTUM: CPT | Mod: FOREIGN READ | Performed by: RADIOLOGY

## 2024-07-05 PROCEDURE — 96361 HYDRATE IV INFUSION ADD-ON: CPT

## 2024-07-05 PROCEDURE — 93976 VASCULAR STUDY: CPT | Mod: FOREIGN READ | Performed by: RADIOLOGY

## 2024-07-05 PROCEDURE — 85025 COMPLETE CBC W/AUTO DIFF WBC: CPT | Performed by: EMERGENCY MEDICINE

## 2024-07-05 PROCEDURE — 80053 COMPREHEN METABOLIC PANEL: CPT | Performed by: EMERGENCY MEDICINE

## 2024-07-05 PROCEDURE — 96375 TX/PRO/DX INJ NEW DRUG ADDON: CPT

## 2024-07-05 PROCEDURE — 99285 EMERGENCY DEPT VISIT HI MDM: CPT | Mod: 25

## 2024-07-05 PROCEDURE — 93975 VASCULAR STUDY: CPT

## 2024-07-05 PROCEDURE — 2500000004 HC RX 250 GENERAL PHARMACY W/ HCPCS (ALT 636 FOR OP/ED): Performed by: EMERGENCY MEDICINE

## 2024-07-05 PROCEDURE — 36415 COLL VENOUS BLD VENIPUNCTURE: CPT | Performed by: EMERGENCY MEDICINE

## 2024-07-05 PROCEDURE — 2550000001 HC RX 255 CONTRASTS: Performed by: EMERGENCY MEDICINE

## 2024-07-05 PROCEDURE — 74177 CT ABD & PELVIS W/CONTRAST: CPT

## 2024-07-05 PROCEDURE — 2500000002 HC RX 250 W HCPCS SELF ADMINISTERED DRUGS (ALT 637 FOR MEDICARE OP, ALT 636 FOR OP/ED): Performed by: EMERGENCY MEDICINE

## 2024-07-05 PROCEDURE — 81001 URINALYSIS AUTO W/SCOPE: CPT | Performed by: EMERGENCY MEDICINE

## 2024-07-05 PROCEDURE — 74177 CT ABD & PELVIS W/CONTRAST: CPT | Mod: FOREIGN READ | Performed by: RADIOLOGY

## 2024-07-05 RX ORDER — CEPHALEXIN 500 MG/1
500 CAPSULE ORAL 4 TIMES DAILY
Qty: 28 CAPSULE | Refills: 0 | Status: SHIPPED | OUTPATIENT
Start: 2024-07-05 | End: 2024-07-12

## 2024-07-05 RX ORDER — CEPHALEXIN 500 MG/1
500 CAPSULE ORAL 4 TIMES DAILY
Qty: 28 CAPSULE | Refills: 0 | Status: SHIPPED | OUTPATIENT
Start: 2024-07-05 | End: 2024-07-05

## 2024-07-05 RX ORDER — POTASSIUM CHLORIDE 20 MEQ/1
20 TABLET, EXTENDED RELEASE ORAL ONCE
Status: COMPLETED | OUTPATIENT
Start: 2024-07-05 | End: 2024-07-05

## 2024-07-05 RX ORDER — ONDANSETRON 4 MG/1
4 TABLET, ORALLY DISINTEGRATING ORAL EVERY 8 HOURS PRN
Qty: 15 TABLET | Refills: 0 | Status: SHIPPED | OUTPATIENT
Start: 2024-07-05 | End: 2024-07-05

## 2024-07-05 RX ORDER — ONDANSETRON HYDROCHLORIDE 2 MG/ML
4 INJECTION, SOLUTION INTRAVENOUS ONCE
Status: COMPLETED | OUTPATIENT
Start: 2024-07-05 | End: 2024-07-05

## 2024-07-05 RX ORDER — ONDANSETRON 4 MG/1
4 TABLET, ORALLY DISINTEGRATING ORAL EVERY 8 HOURS PRN
Qty: 15 TABLET | Refills: 0 | Status: SHIPPED | OUTPATIENT
Start: 2024-07-05

## 2024-07-05 RX ORDER — DOXYCYCLINE 100 MG/1
100 TABLET ORAL 2 TIMES DAILY
Qty: 14 TABLET | Refills: 0 | Status: SHIPPED | OUTPATIENT
Start: 2024-07-05 | End: 2024-07-12

## 2024-07-05 RX ORDER — DOXYCYCLINE 100 MG/1
100 TABLET ORAL 2 TIMES DAILY
Qty: 14 TABLET | Refills: 0 | Status: SHIPPED | OUTPATIENT
Start: 2024-07-05 | End: 2024-07-05

## 2024-07-05 RX ORDER — CEFTRIAXONE 2 G/50ML
2 INJECTION, SOLUTION INTRAVENOUS ONCE
Status: COMPLETED | OUTPATIENT
Start: 2024-07-05 | End: 2024-07-05

## 2024-07-05 ASSESSMENT — COLUMBIA-SUICIDE SEVERITY RATING SCALE - C-SSRS
6. HAVE YOU EVER DONE ANYTHING, STARTED TO DO ANYTHING, OR PREPARED TO DO ANYTHING TO END YOUR LIFE?: NO
1. IN THE PAST MONTH, HAVE YOU WISHED YOU WERE DEAD OR WISHED YOU COULD GO TO SLEEP AND NOT WAKE UP?: NO
2. HAVE YOU ACTUALLY HAD ANY THOUGHTS OF KILLING YOURSELF?: NO

## 2024-07-05 ASSESSMENT — PAIN SCALES - GENERAL: PAINLEVEL_OUTOF10: 5 - MODERATE PAIN

## 2024-07-05 ASSESSMENT — PAIN - FUNCTIONAL ASSESSMENT: PAIN_FUNCTIONAL_ASSESSMENT: 0-10

## 2024-07-05 NOTE — ED PROVIDER NOTES
"HPI   Chief Complaint   Patient presents with    Abscess     Abscess on \"scrotum\" \"strong smell\" draining yellow/bloody started Tuesday        Limitations to History: None    HPI: 53-year-old male presents with concern for right groin pain and swelling as well as redness to the right side of his scrotum.  Present over the past 3 days.  Foul drainage.  Denies any fever or chills.  Admits to nausea and vomiting.  Denies any abdominal pain or dysuria.  History of alcoholism.  1-2 bottles of vodka per day.    Additional History Obtained from: Significant other at the bedside.    ------------------------------------------------------------------------------------------------------------------------------------------  Physical Exam:    VS: As documented in the triage note and EMR flowsheet from this visit were reviewed.    Appearance: Alert. cooperative,  in no acute distress.   Skin: Erythematous changes lateral to the right scrotum and onto the lateral aspect of the right scrotum.  Indurated.   HENT: Normocephalic, atraumatic. Nares patent. No intraoral lesions.   Neck: Supple, without meningismus. Trachea at midline. No lymphadenopathy.  Pulmonary: Clear bilaterally with good chest wall excursion. No rales, rhonchi or wheezing. No accessory muscle use or stridor.  Cardiac: Regular rate and rhythm, no rubs, murmurs, or gallops.   Abdomen: Abdomen is soft, nontender, and nondistended.  No palpable organomegaly.  No rebound or guarding.  No CVA tenderness. Nonsurgical abdomen.  Genitourinary: Normal inspection of the penis.  Indurated and erythematous right scrotum.  Extending into the right groin.  Musculoskeletal: Full range of motion.  Pulses full and equal. No cyanosis, clubbing, or edema.  Psychiatric: Appropriate mood and affect.                            [place] Coma Scale Score: 15                     Patient History   Past Medical History:   Diagnosis Date    Alcohol abuse     Arthritis     Chronic obstructive " pulmonary disease, unspecified (Multi)     Chronic bronchitis, obstructive    Cutaneous abscess, unspecified 09/10/2021    Abscess    Hypertension     Personal history of diseases of the blood and blood-forming organs and certain disorders involving the immune mechanism     History of immunodeficiency    Personal history of other diseases of the musculoskeletal system and connective tissue     History of rheumatoid arthritis    Psoriatic arthritis mutilans (Multi)     Arthritis mutilans     Past Surgical History:   Procedure Laterality Date    JOINT REPLACEMENT Right 2019    OTHER SURGICAL HISTORY  09/10/2021    Colonoscopy    OTHER SURGICAL HISTORY  09/10/2021    Shoulder surgery    SHOULDER SURGERY Right     SKIN CANCER EXCISION      TESTICLE SURGERY       Family History   Problem Relation Name Age of Onset    Lung cancer Mother      Brain cancer Mother      Lung cancer Father      Throat cancer Father       Social History     Tobacco Use    Smoking status: Every Day     Current packs/day: 0.50     Types: Cigarettes    Smokeless tobacco: Never   Vaping Use    Vaping status: Never Used   Substance Use Topics    Alcohol use: Yes     Comment: 1 umesh urrutia    Drug use: Never       Physical Exam   ED Triage Vitals [07/05/24 1153]   Temperature Pulse Respirations BP   37.1 °C (98.7 °F) -- 18 (!) 143/101      Pulse Ox Temp src Heart Rate Source Patient Position   99 % -- -- --      BP Location FiO2 (%)     -- --       Physical Exam    ED Course & MDM   Diagnoses as of 07/05/24 1417   Inguinal abscess   Cellulitis of scrotum   Hypokalemia   Alcohol abuse       Medical Decision Making  Labs Reviewed  CBC WITH AUTO DIFFERENTIAL - Abnormal     WBC                           7.6                    nRBC                          0.0                    RBC                           4.37 (*)               Hemoglobin                    13.8                   Hematocrit                    40.9 (*)               MCV                            94                     MCH                           31.6                   MCHC                          33.7                   RDW                           13.2                   Platelets                     148 (*)                Neutrophils %                 66.6                   Immature Granulocytes %, Automated   0.3                    Lymphocytes %                 21.9                   Monocytes %                   8.7                    Eosinophils %                 2.1                    Basophils %                   0.4                    Neutrophils Absolute          5.04                   Immature Granulocytes Absolute, Au*   0.02                   Lymphocytes Absolute          1.66                   Monocytes Absolute            0.66                   Eosinophils Absolute          0.16                   Basophils Absolute            0.03                COMPREHENSIVE METABOLIC PANEL - Abnormal     Glucose                       91                     Sodium                        138                    Potassium                     3.3 (*)                Chloride                      102                    Bicarbonate                   25                     Anion Gap                     14                     Urea Nitrogen                 14                     Creatinine                    0.84                   eGFR                          >90                    Calcium                       8.4 (*)                Albumin                       4.1                    Alkaline Phosphatase          92                     Total Protein                 6.9                    AST                           83 (*)                 Bilirubin, Total              0.7                    ALT                           63 (*)              URINALYSIS WITH REFLEX CULTURE AND MICROSCOPIC - Abnormal     Color, Urine                  Yellow                 Appearance, Urine             Clear                   Specific Gravity, Urine       1.021                  pH, Urine                     6.0                    Protein, Urine                70 (1+) (*)               Glucose, Urine                Normal                 Blood, Urine                  0.06 (1+) (*)               Ketones, Urine                TRACE (*)               Bilirubin, Urine              NEGATIVE                Urobilinogen, Urine           Normal                 Nitrite, Urine                NEGATIVE                Leukocyte Esterase, Urine     NEGATIVE             URINALYSIS WITH REFLEX CULTURE AND MICROSCOPIC         Narrative: The following orders were created for panel order Urinalysis with Reflex Culture and Microscopic.                  Procedure                               Abnormality         Status                                     ---------                               -----------         ------                                     Urinalysis with Reflex C...[[id number]]  Abnormal            Final result                               Extra Urine Gray Tube[[id number]]                            In process                                                   Please view results for these tests on the individual orders.  EXTRA URINE GRAY TUBE  URINALYSIS MICROSCOPIC WITH REFLEX CULTURE  CT abdomen pelvis w IV contrast   Final Result    1.  Skin thickening with subcutaneous edema over the left lower    quadrant, suspicious for cellulitis.    2.  Tiny focal area of parenchymal stranding in the midpole of left    kidney, suggestive of pyelonephritis.  Correlation urinalysis is    recommended.    3.  Mild stranding within the visualized upper aspect of the right    scrotum.  No fluid collections are appreciated within the right    inguinal region.    4.  Fatty infiltration of the liver.    5.  Right-sided nephrolithiasis, with no evidence of hydronephrosis.    Signed by Juan Luis Baird MD     US scrotum w doppler   Final Result    Right  varicocele.    Probable right groin abscess measuring 2.8 x 0.6 x 2.6 cm new finding    when compared to prior ultrasound from January 2024.    Normal testicular spectral doppler evaluation.    Signed by Dominic Kahn MD     Medical Decision Making:    Patient appears well nontoxic.  Hypertensive but afebrile.  Patient treated with 1 L of normal saline and intravenous Zofran.  Found to have hypokalemia which was treated with oral potassium.  No leukocytosis.  Ultrasound shows complex mixed cystic area within the right groin measuring 2.8 x 0.6 x 2.6 cm.  Concerning for possible abscess.  This is actively draining.  CT of the abdomen pelvis shows skin thickening with subcutaneous edema of her left lower quadrant suspicious for cellulitis.  Mild stranding within the upper portion of the right scrotum without fluid collection.  Given that this area is actively draining I will not perform incision and drainage at this time.  Patient be treated with 2 g of intravenous Rocephin.  Patient will be treated with oral Keflex and doxycycline as an outpatient.  Will also be given ODT Zofran.  Advised on decreasing his alcohol consumption given a mild transaminitis.  Patient will be given primary care follow-up.  Also given urology follow-up if worsening.  Asked to return for new or worsening symptoms.  Stable at time of discharge.    Differential Diagnoses Considered: Scrotal abscess, cellulitis, alcohol abuse, volume depletion, electrolyte abnormality    Independent Interpretation of Studies:  I independently interpreted: CT of the abdomen pelvis shows no intra-abdominal free air.    Escalation of Care:  Appropriate for discharge and follow-up with primary care and urology.    Prescription Drug Consideration: Oral Keflex, doxycycline, ODT Zofran.          Procedure  Procedures     Darwin Kramer DO  07/05/24 9301

## 2025-06-23 ENCOUNTER — APPOINTMENT (OUTPATIENT)
Dept: CARDIOLOGY | Facility: HOSPITAL | Age: 55
End: 2025-06-23
Payer: COMMERCIAL

## 2025-06-23 ENCOUNTER — HOSPITAL ENCOUNTER (EMERGENCY)
Facility: HOSPITAL | Age: 55
Discharge: HOME | End: 2025-06-23
Payer: COMMERCIAL

## 2025-06-23 ENCOUNTER — APPOINTMENT (OUTPATIENT)
Dept: RADIOLOGY | Facility: HOSPITAL | Age: 55
End: 2025-06-23
Payer: COMMERCIAL

## 2025-06-23 VITALS
RESPIRATION RATE: 17 BRPM | HEART RATE: 67 BPM | BODY MASS INDEX: 27.2 KG/M2 | SYSTOLIC BLOOD PRESSURE: 114 MMHG | OXYGEN SATURATION: 95 % | DIASTOLIC BLOOD PRESSURE: 81 MMHG | HEIGHT: 70 IN | WEIGHT: 190 LBS | TEMPERATURE: 97.8 F

## 2025-06-23 DIAGNOSIS — R07.9 CHEST PAIN, UNSPECIFIED TYPE: Primary | ICD-10-CM

## 2025-06-23 LAB
ALBUMIN SERPL BCP-MCNC: 4.4 G/DL (ref 3.4–5)
ALP SERPL-CCNC: 65 U/L (ref 33–120)
ALT SERPL W P-5'-P-CCNC: 18 U/L (ref 10–52)
ANION GAP SERPL CALC-SCNC: 10 MMOL/L (ref 10–20)
AST SERPL W P-5'-P-CCNC: 13 U/L (ref 9–39)
BASOPHILS # BLD AUTO: 0.06 X10*3/UL (ref 0–0.1)
BASOPHILS NFR BLD AUTO: 0.8 %
BILIRUB SERPL-MCNC: 0.5 MG/DL (ref 0–1.2)
BUN SERPL-MCNC: 8 MG/DL (ref 6–23)
CALCIUM SERPL-MCNC: 9.6 MG/DL (ref 8.6–10.3)
CARDIAC TROPONIN I PNL SERPL HS: 3 NG/L (ref 0–20)
CARDIAC TROPONIN I PNL SERPL HS: 3 NG/L (ref 0–20)
CHLORIDE SERPL-SCNC: 105 MMOL/L (ref 98–107)
CO2 SERPL-SCNC: 27 MMOL/L (ref 21–32)
CREAT SERPL-MCNC: 1.01 MG/DL (ref 0.5–1.3)
EGFRCR SERPLBLD CKD-EPI 2021: 89 ML/MIN/1.73M*2
EOSINOPHIL # BLD AUTO: 0.26 X10*3/UL (ref 0–0.7)
EOSINOPHIL NFR BLD AUTO: 3.6 %
ERYTHROCYTE [DISTWIDTH] IN BLOOD BY AUTOMATED COUNT: 12.7 % (ref 11.5–14.5)
GLUCOSE SERPL-MCNC: 88 MG/DL (ref 74–99)
HCT VFR BLD AUTO: 41.9 % (ref 41–52)
HGB BLD-MCNC: 14.2 G/DL (ref 13.5–17.5)
IMM GRANULOCYTES # BLD AUTO: 0.01 X10*3/UL (ref 0–0.7)
IMM GRANULOCYTES NFR BLD AUTO: 0.1 % (ref 0–0.9)
LYMPHOCYTES # BLD AUTO: 2.52 X10*3/UL (ref 1.2–4.8)
LYMPHOCYTES NFR BLD AUTO: 35.2 %
MAGNESIUM SERPL-MCNC: 2.15 MG/DL (ref 1.6–2.4)
MCH RBC QN AUTO: 29.5 PG (ref 26–34)
MCHC RBC AUTO-ENTMCNC: 33.9 G/DL (ref 32–36)
MCV RBC AUTO: 87 FL (ref 80–100)
MONOCYTES # BLD AUTO: 0.48 X10*3/UL (ref 0.1–1)
MONOCYTES NFR BLD AUTO: 6.7 %
NEUTROPHILS # BLD AUTO: 3.82 X10*3/UL (ref 1.2–7.7)
NEUTROPHILS NFR BLD AUTO: 53.6 %
NRBC BLD-RTO: 0 /100 WBCS (ref 0–0)
PLATELET # BLD AUTO: 276 X10*3/UL (ref 150–450)
POTASSIUM SERPL-SCNC: 3.9 MMOL/L (ref 3.5–5.3)
PROT SERPL-MCNC: 7.2 G/DL (ref 6.4–8.2)
RBC # BLD AUTO: 4.81 X10*6/UL (ref 4.5–5.9)
SODIUM SERPL-SCNC: 138 MMOL/L (ref 136–145)
WBC # BLD AUTO: 7.2 X10*3/UL (ref 4.4–11.3)

## 2025-06-23 PROCEDURE — 99284 EMERGENCY DEPT VISIT MOD MDM: CPT

## 2025-06-23 PROCEDURE — 71045 X-RAY EXAM CHEST 1 VIEW: CPT

## 2025-06-23 PROCEDURE — 80053 COMPREHEN METABOLIC PANEL: CPT | Performed by: NURSE PRACTITIONER

## 2025-06-23 PROCEDURE — 36415 COLL VENOUS BLD VENIPUNCTURE: CPT | Performed by: NURSE PRACTITIONER

## 2025-06-23 PROCEDURE — 83735 ASSAY OF MAGNESIUM: CPT | Performed by: NURSE PRACTITIONER

## 2025-06-23 PROCEDURE — 85025 COMPLETE CBC W/AUTO DIFF WBC: CPT | Performed by: NURSE PRACTITIONER

## 2025-06-23 PROCEDURE — 93005 ELECTROCARDIOGRAM TRACING: CPT

## 2025-06-23 PROCEDURE — 84484 ASSAY OF TROPONIN QUANT: CPT | Performed by: NURSE PRACTITIONER

## 2025-06-23 PROCEDURE — 71045 X-RAY EXAM CHEST 1 VIEW: CPT | Performed by: RADIOLOGY

## 2025-06-23 PROCEDURE — 99285 EMERGENCY DEPT VISIT HI MDM: CPT

## 2025-06-23 ASSESSMENT — HEART SCORE
ECG: NORMAL
HISTORY: SLIGHTLY SUSPICIOUS
RISK FACTORS: >2 RISK FACTORS OR HX OF ATHEROSCLEROTIC DISEASE
HEART SCORE: 3
TROPONIN: LESS THAN OR EQUAL TO NORMAL LIMIT
AGE: 45-64

## 2025-06-23 ASSESSMENT — PAIN DESCRIPTION - DESCRIPTORS
DESCRIPTORS: SHARP
DESCRIPTORS: SHARP

## 2025-06-23 ASSESSMENT — PAIN DESCRIPTION - ORIENTATION: ORIENTATION: MID

## 2025-06-23 ASSESSMENT — PAIN SCALES - GENERAL
PAINLEVEL_OUTOF10: 3
PAINLEVEL_OUTOF10: 3

## 2025-06-23 ASSESSMENT — PAIN - FUNCTIONAL ASSESSMENT: PAIN_FUNCTIONAL_ASSESSMENT: 0-10

## 2025-06-23 ASSESSMENT — PAIN DESCRIPTION - LOCATION: LOCATION: CHEST

## 2025-06-23 ASSESSMENT — PAIN DESCRIPTION - PAIN TYPE: TYPE: ACUTE PAIN

## 2025-06-23 NOTE — ED PROVIDER NOTES
Chief Complaint   Patient presents with    Chest Pain     Pt complains of intermittent CP x 2 weeks. Pt does have a grade 4 hiatal hernia and is unsure if that is what's causing the pain or something else.         Patient History    Medical History[1]   Surgical History[2]   Family History[3]   Social History     Social History Narrative    Not on file      RX Allergies[4]     PMH: Reviewed  PSH: Reviewed  Social History: Reviewed.   Allergies reviewed.     HPI: Jaime Liz Jr. is a 54 y.o. male who presents to the ED today accompanied by his wife with complaints of chest pain.  States pain has been coming on intermittently for the last 2 weeks.  Has been having this pain for a long time, attributed to his hiatal hernia.  Had an EGD done several years ago for similar pains.  Has an appointment with his physician in 4 days, but felt that this pain has been prolonged and they need it checked out sooner.  Normally takes Rolaids for his symptoms and the pain improves but he states he took several tonight and there was no improvement.  No radiation of the pain to the left side of his chest.  Occasional radiate to the right side and up into the right side of his jaw.      REVIEW OF SYSTEMS:  All other systems reviewed and negative except as listed in HPI.    PHYSICAL EXAM:    GENERAL: Vitals noted, no distress. Alert and oriented x 3. Non-toxic.     EENT: TMs clear. Posterior oropharynx unremarkable. EOMI, no nystagmus noted.     NECK: Supple. No masses. No midline tenderness. No meningeal signs.     CARDIAC: Regular rate, rhythm. No murmurs rubs or gallops. No JVD.    PULMONARY: Lungs clear and equal bilaterally. No wheezes rales or rhonchi. No respiratory distress.     ABDOMEN: Soft, nondistended, and nontender. No peritoneal signs. Bowel sounds are present and normoactive in all 4 quadrants. No pulsatile masses.     EXTREMITIES: No peripheral edema.     SKIN: No rash. Warm, dry, and intact.     NEURO: No focal  neurologic deficits.     Labs Reviewed   COMPREHENSIVE METABOLIC PANEL - Normal       Result Value    Glucose 88      Sodium 138      Potassium 3.9      Chloride 105      Bicarbonate 27      Anion Gap 10      Urea Nitrogen 8      Creatinine 1.01      eGFR 89      Calcium 9.6      Albumin 4.4      Alkaline Phosphatase 65      Total Protein 7.2      AST 13      Bilirubin, Total 0.5      ALT 18     MAGNESIUM - Normal    Magnesium 2.15     SERIAL TROPONIN-INITIAL - Normal    Troponin I, High Sensitivity 3      Narrative:     Less than 99th percentile of normal range cutoff-  Female and children under 18 years old <14 ng/L; Male <21 ng/L: Negative  Repeat testing should be performed if clinically indicated.     Female and children under 18 years old 14-50 ng/L; Male 21-50 ng/L:  Consistent with possible cardiac damage and possible increased clinical   risk. Serial measurements may help to assess extent of myocardial damage.     >50 ng/L: Consistent with cardiac damage, increased clinical risk and  myocardial infarction. Serial measurements may help assess extent of   myocardial damage.      NOTE: Children less than 1 year old may have higher baseline troponin   levels and results should be interpreted in conjunction with the overall   clinical context.     NOTE: Troponin I testing is performed using a different   testing methodology at [facility] than at other   [facility]. Direct result comparisons should only   be made within the same method.   SERIAL TROPONIN, 1 HOUR - Normal    Troponin I, High Sensitivity 3      Narrative:     Less than 99th percentile of normal range cutoff-  Female and children under 18 years old <14 ng/L; Male <21 ng/L: Negative  Repeat testing should be performed if clinically indicated.     Female and children under 18 years old 14-50 ng/L; Male 21-50 ng/L:  Consistent with possible cardiac damage and possible increased clinical   risk. Serial measurements may help to assess  extent of myocardial damage.     >50 ng/L: Consistent with cardiac damage, increased clinical risk and  myocardial infarction. Serial measurements may help assess extent of   myocardial damage.      NOTE: Children less than 1 year old may have higher baseline troponin   levels and results should be interpreted in conjunction with the overall   clinical context.     NOTE: Troponin I testing is performed using a different   testing methodology at [facility] than at other   [facility]. Direct result comparisons should only   be made within the same method.   TROPONIN SERIES- (INITIAL, 1 HR)    Narrative:     The following orders were created for panel order Troponin I Series, High Sensitivity (0, 1 HR).  Procedure                               Abnormality         Status                     ---------                               -----------         ------                     Troponin I, High Sensiti...[[id number]]  Normal              Final result               Troponin, High Sensitivi...[[id number]]  Normal              Final result                 Please view results for these tests on the individual orders.   CBC WITH AUTO DIFFERENTIAL    WBC 7.2      nRBC 0.0      RBC 4.81      Hemoglobin 14.2      Hematocrit 41.9      MCV 87      MCH 29.5      MCHC 33.9      RDW 12.7      Platelets 276      Neutrophils % 53.6      Immature Granulocytes %, Automated 0.1      Lymphocytes % 35.2      Monocytes % 6.7      Eosinophils % 3.6      Basophils % 0.8      Neutrophils Absolute 3.82      Immature Granulocytes Absolute, Automated 0.01      Lymphocytes Absolute 2.52      Monocytes Absolute 0.48      Eosinophils Absolute 0.26      Basophils Absolute 0.06          XR chest 1 view   Final Result   No acute cardiopulmonary process.        MACRO:   None        Signed by: Allen Amaya 6/23/2025 6:56 PM   Dictation workstation:   [id number]           Medical Decision Making  Amount and/or Complexity of Data  Reviewed  Labs: ordered.  Radiology: ordered.  ECG/medicine tests: ordered.    EKG interpreted by myself shows SR with rate of 81. Normal axis. MS interval 180. QRS interval 92. QT interval 362. QTc interval 420. Non-specific ST-T wave changes. No acute ischemia or injury pattern.     EKG #2 interpreted by myself shows SR with rate of 71. Normal axis. MS interval 184. QRS interval 96. QT interval 386. QTc interval 419. Non-specific ST-T wave changes. No acute ischemia or injury pattern.       ED COURSE: This patient was seen and examined by myself independently.He is placed on a continuous cardiac monitor with pulse oximetry monitoring. Old records and EKGs are obtained and reviewed. IV heplock is established, labs are obtained and noted above.         Heart score: 3    Differential Diagnoses Considered: mi/nstemi, gerd, hernia, bronchitis, pneumonia    Chronic Medical Conditions Significantly Affecting Care: see above    External Records Reviewed: I reviewed recent and relevant outside records including: PCP notes, prior discharge summary, previous radiologic studies    Diagnostic testing considered: blood, urine, EKG, cxr    Escalation of Care: Appropriate for outpatient management      DIAGNOSTIC IMPRESSION: #1 chest pain, atypical      Diagnoses as of 06/23/25 2208   Chest pain, unspecified type           [1]   Past Medical History:  Diagnosis Date    Alcohol abuse     Arthritis     Chronic obstructive pulmonary disease, unspecified     Chronic bronchitis, obstructive    Cutaneous abscess, unspecified 09/10/2021    Abscess    Hypertension     Personal history of diseases of the blood and blood-forming organs and certain disorders involving the immune mechanism     History of immunodeficiency    Personal history of other diseases of the musculoskeletal system and connective tissue     History of rheumatoid arthritis    Psoriatic arthritis mutilans (Multi)     Arthritis mutilans    Stroke (Multi) 2023    TIA   [2]    Past Surgical History:  Procedure Laterality Date    JOINT REPLACEMENT Right 2019    OTHER SURGICAL HISTORY  09/10/2021    Colonoscopy    OTHER SURGICAL HISTORY  09/10/2021    Shoulder surgery    SHOULDER SURGERY Right     SKIN CANCER EXCISION      TESTICLE SURGERY     [3]   Family History  Problem Relation Name Age of Onset    Lung cancer Mother      Brain cancer Mother      Lung cancer Father      Throat cancer Father     [4]   Allergies  Allergen Reactions    Oxycodone Itching    Percocet [Oxycodone-Acetaminophen] Itching    Bactrim [Sulfamethoxazole-Trimethoprim] Nausea/vomiting    Celebrex [Celecoxib] Itching        Yuliya Piedra, JOSETTE-CNP  06/23/25 6399

## 2025-06-24 LAB
ATRIAL RATE: 71 BPM
ATRIAL RATE: 81 BPM
HOLD SPECIMEN: NORMAL
P AXIS: 33 DEGREES
P AXIS: 47 DEGREES
P OFFSET: 170 MS
P OFFSET: 176 MS
P ONSET: 118 MS
P ONSET: 121 MS
PR INTERVAL: 180 MS
PR INTERVAL: 184 MS
Q ONSET: 210 MS
Q ONSET: 211 MS
QRS COUNT: 12 BEATS
QRS COUNT: 13 BEATS
QRS DURATION: 92 MS
QRS DURATION: 96 MS
QT INTERVAL: 362 MS
QT INTERVAL: 386 MS
QTC CALCULATION(BAZETT): 419 MS
QTC CALCULATION(BAZETT): 420 MS
QTC FREDERICIA: 400 MS
QTC FREDERICIA: 408 MS
R AXIS: -10 DEGREES
R AXIS: -25 DEGREES
T AXIS: 56 DEGREES
T AXIS: 65 DEGREES
T OFFSET: 392 MS
T OFFSET: 403 MS
VENTRICULAR RATE: 71 BPM
VENTRICULAR RATE: 81 BPM

## (undated) DEVICE — MASK, FACE, ANESTHESIA, ADULT, LARGE, P6, RED

## (undated) DEVICE — CIRCUIT, BREATHING, ADULT, B/V FILTER, HMEF, 3 L BAG, 108 IN

## (undated) DEVICE — LINE, GAS SAMPLING, .05IN 1D 10FT, M/M CONNECTORS

## (undated) DEVICE — STRAP, KNEE AND BODY, SINGLE USE

## (undated) DEVICE — MAT, QUICK WICK, FLUID ABSORPTION, 40X30

## (undated) DEVICE — SOLUTION, PREP, PVP IODINE, FLIP TOP, 4OZ

## (undated) DEVICE — CATHETER TRAY, SURESTEP, 16FR, URINE METER W/STATLOCK

## (undated) DEVICE — GLOVE, PROTEXIS PI CLASSIC, SZ-8.0, PF, PF, LF

## (undated) DEVICE — Device

## (undated) DEVICE — SOLUTION, IRRIGATION, STERILE WATER, 1000 ML, POUR BOTTLE

## (undated) DEVICE — GUIDEWIRE, DUAL SENSOR, .035 X 150 STRAIGHT,  3CM

## (undated) DEVICE — DRESSING, GAUZE, SPONGE, 12 PLY, CURITY, 4 X 4 IN, RIGID TRAY, STERILE

## (undated) DEVICE — STRAP, ARMBOARD, 3IN, BLUE/WHITE, SECURE HOOK

## (undated) DEVICE — SOLUTION, IRRIGATION, USP, STERILE WATER, UROLOGICAL, 3000 ML, BAG